# Patient Record
Sex: FEMALE | Race: WHITE | URBAN - METROPOLITAN AREA
[De-identification: names, ages, dates, MRNs, and addresses within clinical notes are randomized per-mention and may not be internally consistent; named-entity substitution may affect disease eponyms.]

---

## 2018-07-24 ENCOUNTER — CARE COORDINATION (OUTPATIENT)
Dept: CARDIOLOGY | Facility: CLINIC | Age: 58
End: 2018-07-24

## 2018-07-24 NOTE — PROGRESS NOTES
Received message that patient called inquiring about an appt she has with Dr. Almanza on 8/1.  Called patient back to inform her the appt is with Dr. Almanza in [place] on 8/1 as Dr. Almanza sees patients there once/month at the [facility].  Pt confirmed she will be there.

## 2018-08-01 ENCOUNTER — MEDICAL CORRESPONDENCE (OUTPATIENT)
Dept: CARDIOLOGY | Facility: CLINIC | Age: 58
End: 2018-08-01
Payer: MEDICARE

## 2018-08-01 ENCOUNTER — TRANSFERRED RECORDS (OUTPATIENT)
Dept: HEALTH INFORMATION MANAGEMENT | Facility: CLINIC | Age: 58
End: 2018-08-01

## 2018-08-01 PROCEDURE — 99214 OFFICE O/P EST MOD 30 MIN: CPT | Mod: ZP | Performed by: INTERNAL MEDICINE

## 2018-08-07 DIAGNOSIS — I42.8 LEFT VENTRICULAR NON-COMPACTION CARDIOMYOPATHY (H): Primary | ICD-10-CM

## 2018-08-07 DIAGNOSIS — I50.22 CHRONIC SYSTOLIC HEART FAILURE (H): ICD-10-CM

## 2018-08-07 RX ORDER — LIDOCAINE 40 MG/G
CREAM TOPICAL
Status: CANCELLED | OUTPATIENT
Start: 2018-08-07 | End: 2018-12-07

## 2018-08-27 DIAGNOSIS — I50.22 CHRONIC SYSTOLIC CONGESTIVE HEART FAILURE (H): ICD-10-CM

## 2018-08-27 DIAGNOSIS — I42.8 LEFT VENTRICULAR NON-COMPACTION CARDIOMYOPATHY (H): Primary | ICD-10-CM

## 2018-08-30 ENCOUNTER — CARE COORDINATION (OUTPATIENT)
Dept: CARDIOLOGY | Facility: CLINIC | Age: 58
End: 2018-08-30

## 2018-08-30 DIAGNOSIS — E78.5 HYPERLIPIDEMIA LDL GOAL <130: ICD-10-CM

## 2018-08-30 DIAGNOSIS — Z00.00 PREVENTATIVE HEALTH CARE: ICD-10-CM

## 2018-08-30 DIAGNOSIS — J30.2 CHRONIC SEASONAL ALLERGIC RHINITIS, UNSPECIFIED TRIGGER: ICD-10-CM

## 2018-08-30 DIAGNOSIS — G47.09 OTHER INSOMNIA: ICD-10-CM

## 2018-08-30 DIAGNOSIS — Z78.0 MENOPAUSE: ICD-10-CM

## 2018-08-30 DIAGNOSIS — F32.A DEPRESSION, UNSPECIFIED DEPRESSION TYPE: ICD-10-CM

## 2018-08-30 DIAGNOSIS — A04.72 C. DIFFICILE COLITIS: ICD-10-CM

## 2018-08-30 DIAGNOSIS — Z72.0 CURRENT TOBACCO USE: ICD-10-CM

## 2018-08-30 DIAGNOSIS — I42.8 LEFT VENTRICULAR NON-COMPACTION CARDIOMYOPATHY (H): Primary | ICD-10-CM

## 2018-08-30 DIAGNOSIS — B07.8 OTHER VIRAL WARTS: ICD-10-CM

## 2018-08-30 DIAGNOSIS — I50.22 CHRONIC SYSTOLIC CONGESTIVE HEART FAILURE (H): ICD-10-CM

## 2018-08-30 DIAGNOSIS — K57.30 DIVERTICULOSIS OF LARGE INTESTINE WITHOUT HEMORRHAGE: ICD-10-CM

## 2018-08-30 DIAGNOSIS — M85.80 OSTEOPENIA, UNSPECIFIED LOCATION: ICD-10-CM

## 2018-08-30 DIAGNOSIS — M19.90 ARTHRITIS: ICD-10-CM

## 2018-08-30 DIAGNOSIS — G62.9 NEUROPATHY: Primary | ICD-10-CM

## 2018-08-30 DIAGNOSIS — K21.9 GASTROESOPHAGEAL REFLUX DISEASE, ESOPHAGITIS PRESENCE NOT SPECIFIED: ICD-10-CM

## 2018-08-30 DIAGNOSIS — G47.00 INSOMNIA, UNSPECIFIED TYPE: ICD-10-CM

## 2018-08-30 DIAGNOSIS — R00.2 PALPITATIONS: ICD-10-CM

## 2018-08-30 DIAGNOSIS — I10 ESSENTIAL HYPERTENSION: ICD-10-CM

## 2018-09-04 ENCOUNTER — HEALTH MAINTENANCE LETTER (OUTPATIENT)
Age: 58
End: 2018-09-04

## 2018-09-05 RX ORDER — CLOPIDOGREL BISULFATE 75 MG/1
75 TABLET ORAL DAILY
COMMUNITY
Start: 2018-08-30

## 2018-09-05 RX ORDER — ATORVASTATIN CALCIUM 80 MG/1
80 TABLET, FILM COATED ORAL DAILY
COMMUNITY
Start: 2018-08-30

## 2018-09-05 RX ORDER — METOPROLOL TARTRATE 100 MG
200 TABLET ORAL DAILY
COMMUNITY
Start: 2018-08-30 | End: 2018-09-11

## 2018-09-05 ASSESSMENT — ENCOUNTER SYMPTOMS
EYE REDNESS: 0
NERVOUS/ANXIOUS: 1
BOWEL INCONTINENCE: 0
ALTERED TEMPERATURE REGULATION: 1
SYNCOPE: 0
SINUS CONGESTION: 1
DEPRESSION: 1
HOT FLASHES: 0
BACK PAIN: 1
FATIGUE: 1
SLEEP DISTURBANCES DUE TO BREATHING: 1
TROUBLE SWALLOWING: 0
DECREASED LIBIDO: 1
TREMORS: 0
PALPITATIONS: 1
RECTAL PAIN: 0
HYPERTENSION: 0
POOR WOUND HEALING: 0
BREAST PAIN: 0
SMELL DISTURBANCE: 1
DECREASED CONCENTRATION: 1
SKIN CHANGES: 0
FEVER: 0
PANIC: 0
SWOLLEN GLANDS: 0
COUGH: 1
BLOATING: 1
HALLUCINATIONS: 0
CONSTIPATION: 1
BLOOD IN STOOL: 0
DOUBLE VISION: 0
NUMBNESS: 1
HEMATURIA: 0
MUSCLE WEAKNESS: 1
INCREASED ENERGY: 1
INSOMNIA: 1
DYSPNEA ON EXERTION: 1
HEARTBURN: 1
POSTURAL DYSPNEA: 0
LEG PAIN: 1
NIGHT SWEATS: 1
FLANK PAIN: 0
WEIGHT GAIN: 1
BREAST MASS: 0
EYE PAIN: 0
MUSCLE CRAMPS: 1
NECK PAIN: 1
EYE IRRITATION: 1
POLYPHAGIA: 1
SINUS PAIN: 1
WHEEZING: 0
EXERCISE INTOLERANCE: 1
HEADACHES: 0
NECK MASS: 0
VOMITING: 0
SEIZURES: 0
SNORES LOUDLY: 1
MYALGIAS: 1
ABDOMINAL PAIN: 1
DIARRHEA: 1
JOINT SWELLING: 0
DIFFICULTY URINATING: 1
TASTE DISTURBANCE: 1
CHILLS: 0
ORTHOPNEA: 0
WEAKNESS: 1
LIGHT-HEADEDNESS: 1
NAIL CHANGES: 1
NAUSEA: 1
COUGH DISTURBING SLEEP: 1
SPEECH CHANGE: 0
EYE WATERING: 1
SPUTUM PRODUCTION: 0
HYPOTENSION: 0
DECREASED APPETITE: 1
LOSS OF CONSCIOUSNESS: 0
BRUISES/BLEEDS EASILY: 1
WEIGHT LOSS: 0
MEMORY LOSS: 0
DYSURIA: 0
PARALYSIS: 0
HEMOPTYSIS: 0
SHORTNESS OF BREATH: 1
ARTHRALGIAS: 1
POLYDIPSIA: 1
JAUNDICE: 0
DIZZINESS: 1
STIFFNESS: 1
SORE THROAT: 1
TINGLING: 1

## 2018-09-10 ENCOUNTER — PRE VISIT (OUTPATIENT)
Dept: CARDIOLOGY | Facility: CLINIC | Age: 58
End: 2018-09-10

## 2018-09-10 DIAGNOSIS — I50.22 CHRONIC SYSTOLIC CONGESTIVE HEART FAILURE (H): Primary | ICD-10-CM

## 2018-09-10 DIAGNOSIS — Z79.01 CHRONIC ANTICOAGULATION: ICD-10-CM

## 2018-09-10 PROBLEM — G47.09 OTHER INSOMNIA: Status: ACTIVE | Noted: 2018-09-10

## 2018-09-10 RX ORDER — SPIRONOLACTONE 25 MG/1
25 TABLET ORAL DAILY
COMMUNITY
Start: 2018-09-10 | End: 2018-09-11

## 2018-09-10 RX ORDER — FLUOROURACIL 50 MG/G
CREAM TOPICAL
COMMUNITY
Start: 2018-09-10

## 2018-09-10 RX ORDER — SERTRALINE HYDROCHLORIDE 100 MG/1
100 TABLET, FILM COATED ORAL DAILY
COMMUNITY
Start: 2018-09-10

## 2018-09-10 RX ORDER — GABAPENTIN 300 MG/1
600 CAPSULE ORAL 3 TIMES DAILY
COMMUNITY
Start: 2018-09-10

## 2018-09-10 RX ORDER — MULTIPLE VITAMINS W/ MINERALS TAB 9MG-400MCG
1 TAB ORAL DAILY
COMMUNITY
Start: 2018-09-10

## 2018-09-10 RX ORDER — ESOMEPRAZOLE MAGNESIUM 40 MG/1
40 CAPSULE, DELAYED RELEASE ORAL
COMMUNITY
Start: 2018-09-10

## 2018-09-10 RX ORDER — ZOLPIDEM TARTRATE 10 MG/1
10 TABLET ORAL
Status: ON HOLD | COMMUNITY
Start: 2018-09-10 | End: 2018-09-11

## 2018-09-10 RX ORDER — FUROSEMIDE 80 MG
TABLET ORAL
COMMUNITY
Start: 2018-09-10

## 2018-09-10 NOTE — PROGRESS NOTES
Patient is followed by Dr. Almanza in [place] at the Heart Failure Outreach site for chronic systolic heart failure and LV noncompaction cardiomyopathy.  Patient was seen recently in August (scanned under notes tab) and per Dr. Almanza's recommendation, she has been set up for a CPX and RHC at the .  In addition, as patient's son has h/o cardiomyopathy, patient has been set up to see the cardiology genetics counselor, Velia Sharma on the same day. Letter with directions and itinerary has been mailed to patient.  Note: patient records report she is taking warfarin (for LV non-compaction cardiomyopathy?) but dose was not found in records.  She was instructed to hold it for 2 days piror to the RHC via pre-procedure instructions in letter.    *Attempted to call pt today (9/10) to confirm appts.  She was not available, but her  reported she did receive the letter and instructions.

## 2018-09-11 ENCOUNTER — APPOINTMENT (OUTPATIENT)
Dept: CARDIOLOGY | Facility: CLINIC | Age: 58
End: 2018-09-11
Attending: INTERNAL MEDICINE
Payer: MEDICARE

## 2018-09-11 ENCOUNTER — APPOINTMENT (OUTPATIENT)
Dept: LAB | Facility: CLINIC | Age: 58
End: 2018-09-11
Attending: GENETIC COUNSELOR, MS
Payer: MEDICARE

## 2018-09-11 ENCOUNTER — APPOINTMENT (OUTPATIENT)
Dept: MEDSURG UNIT | Facility: CLINIC | Age: 58
End: 2018-09-11
Attending: GENETIC COUNSELOR, MS
Payer: MEDICARE

## 2018-09-11 ENCOUNTER — OFFICE VISIT (OUTPATIENT)
Dept: CARDIOLOGY | Facility: CLINIC | Age: 58
End: 2018-09-11
Attending: GENETIC COUNSELOR, MS
Payer: MEDICARE

## 2018-09-11 ENCOUNTER — HOSPITAL ENCOUNTER (OUTPATIENT)
Facility: CLINIC | Age: 58
Discharge: HOME | End: 2018-09-11
Attending: INTERNAL MEDICINE | Admitting: INTERNAL MEDICINE
Payer: MEDICARE

## 2018-09-11 ENCOUNTER — HOSPITAL ENCOUNTER (OUTPATIENT)
Dept: CARDIOLOGY | Facility: CLINIC | Age: 58
Discharge: HOME | End: 2018-09-11
Attending: INTERNAL MEDICINE | Admitting: INTERNAL MEDICINE
Payer: MEDICARE

## 2018-09-11 VITALS
OXYGEN SATURATION: 96 % | SYSTOLIC BLOOD PRESSURE: 132 MMHG | RESPIRATION RATE: 18 BRPM | DIASTOLIC BLOOD PRESSURE: 50 MMHG | TEMPERATURE: 97.1 F | HEART RATE: 77 BPM

## 2018-09-11 VITALS
HEART RATE: 75 BPM | SYSTOLIC BLOOD PRESSURE: 97 MMHG | DIASTOLIC BLOOD PRESSURE: 62 MMHG | OXYGEN SATURATION: 98 % | BODY MASS INDEX: 22.36 KG/M2 | WEIGHT: 126.2 LBS | HEIGHT: 63 IN

## 2018-09-11 DIAGNOSIS — I50.22 CHRONIC SYSTOLIC CONGESTIVE HEART FAILURE (H): ICD-10-CM

## 2018-09-11 DIAGNOSIS — Z82.49 FAMILY HISTORY OF CARDIOMYOPATHY: ICD-10-CM

## 2018-09-11 DIAGNOSIS — I42.8 LEFT VENTRICULAR NON-COMPACTION CARDIOMYOPATHY (H): ICD-10-CM

## 2018-09-11 DIAGNOSIS — I50.22 CHRONIC SYSTOLIC CONGESTIVE HEART FAILURE (H): Primary | ICD-10-CM

## 2018-09-11 DIAGNOSIS — I42.8 LEFT VENTRICULAR NON-COMPACTION CARDIOMYOPATHY (H): Primary | ICD-10-CM

## 2018-09-11 DIAGNOSIS — Z95.810 ICD (IMPLANTABLE CARDIOVERTER-DEFIBRILLATOR) IN PLACE: ICD-10-CM

## 2018-09-11 DIAGNOSIS — I50.22 CHRONIC SYSTOLIC HEART FAILURE (H): ICD-10-CM

## 2018-09-11 DIAGNOSIS — Z79.01 CHRONIC ANTICOAGULATION: ICD-10-CM

## 2018-09-11 LAB
ABO + RH BLD: NORMAL
ABO + RH BLD: NORMAL
ALBUMIN SERPL-MCNC: 3.5 G/DL (ref 3.4–5)
ALP SERPL-CCNC: 80 U/L (ref 40–150)
ALT SERPL W P-5'-P-CCNC: 42 U/L (ref 0–50)
ANION GAP SERPL CALCULATED.3IONS-SCNC: 5 MMOL/L (ref 3–14)
AST SERPL W P-5'-P-CCNC: 28 U/L (ref 0–45)
BILIRUB SERPL-MCNC: 0.6 MG/DL (ref 0.2–1.3)
BLD GP AB SCN SERPL QL: NORMAL
BLOOD BANK CMNT PATIENT-IMP: NORMAL
BUN SERPL-MCNC: 13 MG/DL (ref 7–30)
CALCIUM SERPL-MCNC: 8.4 MG/DL (ref 8.5–10.1)
CHLORIDE SERPL-SCNC: 105 MMOL/L (ref 94–109)
CO2 SERPL-SCNC: 30 MMOL/L (ref 20–32)
CREAT SERPL-MCNC: 0.63 MG/DL (ref 0.52–1.04)
ERYTHROCYTE [DISTWIDTH] IN BLOOD BY AUTOMATED COUNT: 13.2 % (ref 10–15)
GFR SERPL CREATININE-BSD FRML MDRD: >90 ML/MIN/1.7M2
GLUCOSE SERPL-MCNC: 84 MG/DL (ref 70–99)
HCT VFR BLD AUTO: 40.9 % (ref 35–47)
HGB BLD-MCNC: 13.8 G/DL (ref 11.7–15.7)
INR PPP: 1.19 (ref 0.86–1.14)
MCH RBC QN AUTO: 34.2 PG (ref 26.5–33)
MCHC RBC AUTO-ENTMCNC: 33.7 G/DL (ref 31.5–36.5)
MCV RBC AUTO: 102 FL (ref 78–100)
NT-PROBNP SERPL-MCNC: 1281 PG/ML (ref 0–125)
PLATELET # BLD AUTO: 209 10E9/L (ref 150–450)
POTASSIUM SERPL-SCNC: 3.3 MMOL/L (ref 3.4–5.3)
PROT SERPL-MCNC: 6.8 G/DL (ref 6.8–8.8)
RBC # BLD AUTO: 4.03 10E12/L (ref 3.8–5.2)
SODIUM SERPL-SCNC: 140 MMOL/L (ref 133–144)
SPECIMEN EXP DATE BLD: NORMAL
WBC # BLD AUTO: 10.3 10E9/L (ref 4–11)

## 2018-09-11 PROCEDURE — 85610 PROTHROMBIN TIME: CPT | Performed by: INTERNAL MEDICINE

## 2018-09-11 PROCEDURE — 40000166 ZZH STATISTIC PP CARE STAGE 1

## 2018-09-11 PROCEDURE — 86900 BLOOD TYPING SEROLOGIC ABO: CPT

## 2018-09-11 PROCEDURE — 86850 RBC ANTIBODY SCREEN: CPT

## 2018-09-11 PROCEDURE — 25000125 ZZHC RX 250: Performed by: INTERNAL MEDICINE

## 2018-09-11 PROCEDURE — 27210982 ZZH KIT RT HC TOTES DISP CR7

## 2018-09-11 PROCEDURE — 86901 BLOOD TYPING SEROLOGIC RH(D): CPT

## 2018-09-11 PROCEDURE — 80053 COMPREHEN METABOLIC PANEL: CPT

## 2018-09-11 PROCEDURE — 36415 COLL VENOUS BLD VENIPUNCTURE: CPT | Performed by: INTERNAL MEDICINE

## 2018-09-11 PROCEDURE — 27211181 ZZH BALLOON TIP PRESSURE CR5

## 2018-09-11 PROCEDURE — 94621 CARDIOPULM EXERCISE TESTING: CPT

## 2018-09-11 PROCEDURE — 93451 RIGHT HEART CATH: CPT | Mod: 26 | Performed by: INTERNAL MEDICINE

## 2018-09-11 PROCEDURE — 94621 CARDIOPULM EXERCISE TESTING: CPT | Mod: 26 | Performed by: INTERNAL MEDICINE

## 2018-09-11 PROCEDURE — 93451 RIGHT HEART CATH: CPT

## 2018-09-11 PROCEDURE — 99214 OFFICE O/P EST MOD 30 MIN: CPT | Mod: 24 | Performed by: INTERNAL MEDICINE

## 2018-09-11 PROCEDURE — 93295 DEV INTERROG REMOTE 1/2/MLT: CPT | Performed by: INTERNAL MEDICINE

## 2018-09-11 PROCEDURE — 85027 COMPLETE CBC AUTOMATED: CPT | Performed by: INTERNAL MEDICINE

## 2018-09-11 PROCEDURE — 93296 REM INTERROG EVL PM/IDS: CPT | Mod: ZF

## 2018-09-11 PROCEDURE — 27210787 ZZH MANIFOLD CR2

## 2018-09-11 PROCEDURE — 83880 ASSAY OF NATRIURETIC PEPTIDE: CPT

## 2018-09-11 PROCEDURE — 96040 ZZH GENETIC COUNSELING, EACH 30 MINUTES: CPT | Mod: ZF | Performed by: GENETIC COUNSELOR, MS

## 2018-09-11 PROCEDURE — G0463 HOSPITAL OUTPT CLINIC VISIT: HCPCS | Mod: 25,ZF

## 2018-09-11 RX ORDER — METOPROLOL SUCCINATE 200 MG/1
100 TABLET, EXTENDED RELEASE ORAL DAILY
Qty: 30 TABLET
Start: 2018-09-11

## 2018-09-11 RX ORDER — LIDOCAINE 40 MG/G
CREAM TOPICAL
Status: COMPLETED | OUTPATIENT
Start: 2018-09-11 | End: 2018-09-11

## 2018-09-11 RX ORDER — LOSARTAN POTASSIUM 25 MG/1
25 TABLET ORAL DAILY
Qty: 90 TABLET | Refills: 3 | Status: SHIPPED | OUTPATIENT
Start: 2018-09-11 | End: 2019-11-14

## 2018-09-11 RX ORDER — TEMAZEPAM 15 MG/1
15 CAPSULE ORAL
COMMUNITY

## 2018-09-11 RX ORDER — SACCHAROMYCES BOULARDII 250 MG
250 CAPSULE ORAL
COMMUNITY
Start: 2018-01-08

## 2018-09-11 RX ORDER — FAMOTIDINE 20 MG/1
20 TABLET, FILM COATED ORAL
COMMUNITY
Start: 2018-01-15

## 2018-09-11 RX ADMIN — LIDOCAINE: 40 CREAM TOPICAL at 13:35

## 2018-09-11 ASSESSMENT — PAIN SCALES - GENERAL: PAINLEVEL: NO PAIN (0)

## 2018-09-11 NOTE — LETTER
9/11/2018    RE: Ange Christopher  [address] [place] [id number]       Dear Colleague,    Thank you for the opportunity to participate in the care of your patient, Ange Christopher, at the [facility]. Please see a copy of my visit note below.    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  The patient was initially seen by my colleague, Milagros Thacker MD, on January 22, 2014, and was seen later that same year, but was recently sent to re-establish care in my [facility] is [place] due to her young age and progressive symptoms. The patient reported that over the past several months she has had gradual worsening in her heart failure symptoms. She reports worsening of her exercise tolerance. She feels that she can still do approximately the same amount of activity, it just takes her much more energy to do it, and it is much more difficult for her to do. She does report worsening dyspnea on exertion and increasing issues with volume retention including weight gain, edema, and abdominal bloating. She admits having to take extra doses of Lasix more often. She denies any chest pain or chest pressure, orthopnea, or paroxysmal nocturnal dyspnea (PND). She also denies any palpitations. She denies any problems with her current medications and reports compliance.l stressors in her life at the present moment.       PAST MEDICAL HISTORY:  Nonischemic cardiomyopathy. The patient was initially diagnosed with her nonischemic cardiomyopathy in 2013. At that point in time she presented with flu-like symptoms and was ultimately discovered to have a low ejection fraction. Echocardiograms were consistent with  non-compaction. The patient had a cardiac catheterization on January 18, 2013, and had no evidence of epicardial disease and her ejection fraction was 35%. She also had a Zio Patch study done around that time and was found to have some premature ventricular contractions (PVCs) as well as some ventricular tachyarrhythmia. The patient underwent implantable cardioverter-defibrillator (ICD) placement in March 2013 and she was also started with medical therapy at that time. Due to patient's recent issues with worsening exercise tolerance, as well as atypical chest pain symptoms, the patient underwent a myocardial perfusion scan which revealed moderate-sized, mild-intensity, anterior and anteroseptal wall ischemia. Thus, it was deemed a positive myocardial perfusion scan for moderate-sized, mid-intensity wall ischemia. This was a Regadenoson stress test that was performed. The patient also had an echocardiogram done that same day which revealed left ventricular systolic function was severely reduced with a calculated left ventricular ejection fraction of 22%, the left ventricular cavity size was moderately enlarged with global hypokinesis. There was mild to moderate mitral regurgitation and mild tricuspid regurgitation, and compared to a previous echocardiogram done in September 2015 there was a higher degree of mitral regurgitation and tricuspid regurgitation. Following this positive myocardial Regadenoson stress single-photon emission computerized tomography (SPECT) scan the patient underwent cardiac catheterization. She was found to have minimal to nonocclusive coronary artery disease. A previous stent that was placed in January 2016 in the first diagonal branch of the left anterior descending (LAD) coronary artery which was found to be patent. Left ventricular (LV) systolic pressures were 99 to 103 mmHg with left ventricular end-diastolic pressures (LVEDP) of 21 to 24 mmHg.  Past Medical History:   Diagnosis Date      "Arthritis 2018     C. difficile colitis; 3/2017, 2018     Chronic anticoagulation 9/10/2018     Chronic systolic congestive heart failure (H) 2018     Current tobacco use; 1ppd since 2018     Diverticulosis of large intestine 2018     Essential hypertension 2018     Hyperlipidemia LDL goal <130 2018     Left ventricular non-compaction cardiomyopathy (H) 2014     Osteopenia 2018     Other insomnia 9/10/2018     Palpitations 2018     PAST SURGICAL HISTORY    1.   .   2.   Hysterectomy with oophorectomy.   3.   Tonsillectomy.   4.   Left carpal tunnel surgery.  5.   Bunion surgery on her left foot.   6.   Implantable cardioverter-defibrillator (ICD) implantation in , and relocation in 2013.    FAMILY HISTORY:  Her mother  in her early 70s, she had no known heart problems. Her dad is currently alive with no known heart problems. There is no history of early coronary artery disease, sudden cardiac death, or congenital heart disease; however, her son had left ventricular assist device (LVAD) placed due to nonischemic cardiomyopathy in . Reportedly, he does not have non-compaction but this was thought to be a \"viral cardiomyopathy\". The patient also has a paternal grandmother who  from \"blood clots\", there was some concern she also may have had an issue with heart disease.    SOCIAL HISTORY:  She is an active smoker, she smokes approximately 1/2 to 1 pack per day. She has an approximately 70 pack-year history. She started smoking at age 16. She denies any ethanol (alcohol) use. She denies any intravenous (IV) drug abuse. She is , she has 3 kids.    CURRENT MEDICATIONS:    Current Outpatient Prescriptions on File Prior to Visit:  aspirin 81 MG tablet Take 1 tablet (81 mg) by mouth daily   atorvastatin (LIPITOR) 80 MG tablet Take 1 tablet (80 mg) by mouth daily   cetirizine HCl 10 MG CAPS Take 10 mg by mouth daily as needed " "  cholecalciferol (VITAMIN D3) 400 UNIT TABS tablet Take 1 tablet (400 Units) by mouth 2 times daily   clopidogrel (PLAVIX) 75 MG tablet Take 1 tablet (75 mg) by mouth daily   esomeprazole (NEXIUM) 40 MG CR capsule Take 1 capsule (40 mg) by mouth every morning (before breakfast) Take 30-60 minutes before eating.   ESTRADIOL 0.5/ESTRIOL 0.25 MG/GM CREAM Use twice weekly   fluorouracil (EFUDEX) 5 % cream Apply as directed to warts   furosemide (LASIX) 80 MG tablet Take one tab (80 mg) in am and 1/2 tab (40 mg) in pm daily   gabapentin (NEURONTIN) 300 MG capsule Take 2 capsules (600 mg) by mouth 3 times daily   multivitamin, therapeutic with minerals (MULTI-VITAMIN) TABS tablet Take 1 tablet by mouth daily   PROVIDER DOSED MANAGED WARFARIN, COUMADIN, OUTPATIENT Unknown dose   temazepam (RESTORIL) 15 MG capsule Take 15 mg by mouth nightly as needed for sleep   sertraline (ZOLOFT) 100 MG tablet Take 1 tablet (100 mg) by mouth daily     No current facility-administered medications on file prior to visit.       ROS:   Constitutional: No fever, chills, or sweats.   ENT: No visual disturbance, ear ache, epistaxis, sore throat.   Allergies/Immunologic: Negative.   Respiratory: No cough, hemoptysis.   Cardiovascular: As per HPI.   GI: No nausea, vomiting, hematemesis, melena, or hematochezia.   : No urinary frequency, dysuria, or hematuria.   Integument: Negative.   Psychiatric: Negative.   Neuro: Negative.   Endocrinology: Negative.   Musculoskeletal: Negative.    EXAM:  BP 97/62 (BP Location: Left arm, Patient Position: Chair, Cuff Size: Adult Regular)  Pulse 75  Ht 1.6 m (5' 3\")  Wt 57.2 kg (126 lb 3.2 oz)  SpO2 98%  BMI 22.36 kg/m2  General: appears comfortable, alert and articulate  Head: normocephalic, atraumatic  Eyes: anicteric sclera, EOMI  Neck: no adenopathy, 2+ carotids without bruits  Orophyarynx: moist mucosa, no lesions, dentition intact  Heart: regular, S1/S2, no murmur, gallop, rub, estimated JVP " 6-7cm  Lungs: clear, no rales or wheezing  Abdomen: soft, non-tender, bowel sounds present, no hepatomegaly  Extremities: no clubbing, cyanosis, trace edema  Neurological: normal speech and affect, no gross motor deficits    Labs:  CBC RESULTS:  Lab Results   Component Value Date    WBC 10.3 09/11/2018    RBC 4.03 09/11/2018    HGB 13.8 09/11/2018    HCT 40.9 09/11/2018     (H) 09/11/2018    MCH 34.2 (H) 09/11/2018    MCHC 33.7 09/11/2018    RDW 13.2 09/11/2018     09/11/2018       CMP RESULTS:  Lab Results   Component Value Date     09/11/2018    POTASSIUM 3.3 (L) 09/11/2018    CHLORIDE 105 09/11/2018    CO2 30 09/11/2018    ANIONGAP 5 09/11/2018    GLC 84 09/11/2018    BUN 13 09/11/2018    CR 0.63 09/11/2018    GFRESTIMATED >90 09/11/2018    GFRESTBLACK >90 09/11/2018    MAURO 8.4 (L) 09/11/2018    BILITOTAL 0.6 09/11/2018    ALBUMIN 3.5 09/11/2018    ALKPHOS 80 09/11/2018    ALT 42 09/11/2018    AST 28 09/11/2018        INR RESULTS:  Lab Results   Component Value Date    INR 1.19 (H) 09/11/2018       No results found for: MAG  No results found for: NTBNPI  Lab Results   Component Value Date    NTBNP 1281 (H) 09/11/2018     RHC 9/11/18  1. HR 75 bpm  2. BP 99/54/69 mmHg  3. RA 6/6/5   4. RV 29/6  5. PA 27/12/17   6. PCW 14/14/12   7. PA sat 64%   9. Hgb 12.2 g/dL   10. Ana CO 4.2   11. Ana CI 2.6   12. TD CO 4.2   13. TD CI 2.6   14. 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   1. Chronic systolic heart failure secondary to nonischemic/familial cardiomyopathy.    Stage C  NYHA Class IIIb  ACEi/ARB will start losartan 25mg daily  BB decrease metoprolol xl to 100mg daily  Aldosterone antagonist discontinue aldactone while  trying to initiate and up-titrate losartan  SCD prophylaxis ICD  Fluid status euvolemic confirmed by RHC today  CPX revealed exercise limitations patient did not meet anaerobic threshold thus limitations not all cardiac in nature.  At present no indications for advanced therapy given results of RHC.  I did relate to the patient that for her to be a candidate for heart transplantation if needed in the future, that she would need to be cigarette and completely tobacco free, including tobacco replacement products, for at least 3, but preferably 6, months. The patient would also need to have further evaluation of other comorbidities to determine candidacy for advanced therapy should this be indicated in future  Encouraged patient to begin regular aerobic exercise aiming for at least 150 minutes of moderate physical activity each week. and follow low-salt, heart healthy diet.    Pt met with genetic counselor today and opted to pursue genetic testing for familial dilated cardiomyopathy.  Will f/u results.       Follow up:  Labs in 2 weeks.  To see me in October in [place] as scheduled      Beto Almanza MD  Section Head - Advanced Heart Failure, Transplantation and Mechanical Circulatory Support  Co-Director - Adult Congenital and Cardiovascular Genetics Center  Associate Professor of Medicine, [facility]    CC  Patient Care Team:  Angel Corral as PCP - General  BETO ALMANZA

## 2018-09-11 NOTE — DISCHARGE INSTRUCTIONS
[facility]                        Interventional Cardiology  Discharge Instructions   Post Right Heart Cath      AFTER YOU GO HOME:    DO drink plenty of fluids    DO resume your regular diet and medications unless otherwise instructed by your Primary Physician    Do Not scrub the procedure site vigorously    No lotion or powder to the puncture site for 3 days    CALL YOUR PRIMARY PHYSICIAN IF: You may resume all normal activity.  Monitor neck site for bleeding, swelling, or voice changes. If you notice bleeding or swelling immediately apply pressure to the site and call number below to speak with Cardiology Fellow.  If you experience any changes in your breathing you should call your doctor immediately or come to the closest Emergency Department.  Do not drive yourself.    ADDITIONAL INSTRUCTIONS: Medications: You are to resume all home medications including anticoagulation therapy unless otherwise advised by your primary cardiologist or nurse coordinator.    Follow Up: Per your primary cardiology team    If you have any questions or concerns regarding your procedure site please call [phone] at anytime and ask for Cardiology Fellow on call.  They are available 24 hours a day.  You may also contact the Cardiology Clinic after hours number at [phone].                                                       Telephone Numbers [phone] Monday-Friday 8:00 am to 4:30 pm    [phone] After 4:30 pm Monday-Friday, Weekends & Holidays  Ask for Interventional Cardiologist on call. Someone is on call 24 hours/day   [facility] toll free number [phone] Monday-Friday 8:00 am to 4:30 pm   [facility] Emergency Dept [phone]

## 2018-09-11 NOTE — MR AVS SNAPSHOT
After Visit Summary   9/11/2018    Ange Christopher    MRN: [id number]           Patient Information     Date Of Birth          1960        Visit Information        Provider Department      9/11/2018 3:30 PM Velia Sharma [facility]         Follow-ups after your visit        Who to contact     If you have questions or need follow up information about today's clinic visit or your schedule please contact [facility] directly at [phone].  Normal or non-critical lab and imaging results will be communicated to you by [employer]hart, letter or phone within 4 business days after the clinic has received the results. If you do not hear from us within 7 days, please contact the clinic through [employer] or phone. If you have a critical or abnormal lab result, we will notify you by phone as soon as possible.  Submit refill requests through [employer] or call your pharmacy and they will forward the refill request to us. Please allow 3 business days for your refill to be completed.          Additional Information About Your Visit        [employer] Information     [employer] gives you secure access to your electronic health record. If you see a primary care provider, you can also send messages to your care team and make appointments. If you have questions, please call your primary care clinic.  If you do not have a primary care provider, please call [phone] and they will assist you.        Care EveryWhere ID     This is your Care EveryWhere ID. This could be used by other organizations to access your [place] medical records  [id number]         Blood Pressure from Last 3 Encounters:   09/11/18 97/62   09/11/18 132/50    Weight from Last 3 Encounters:   09/11/18 57.2 kg (126 lb 3.2 oz)              Today, you had the following     No orders found for display         Today's Medication Changes          These changes are accurate as of 9/11/18 11:59 PM.  If you have any questions, ask  your nurse or doctor.               Start taking these medicines.        Dose/Directions    losartan 25 MG tablet   Commonly known as:  COZAAR   Used for:  Chronic systolic congestive heart failure (H)   Started by:  Cady Almanza MD        Dose:  25 mg   Take 1 tablet (25 mg) by mouth daily   Quantity:  90 tablet   Refills:  3       metoprolol succinate 200 MG 24 hr tablet   Commonly known as:  TOPROL-XL   Used for:  Chronic systolic congestive heart failure (H)   Started by:  Cady Almanza MD        Dose:  100 mg   Take 0.5 tablets (100 mg) by mouth daily   Quantity:  30 tablet   Refills:  0         Stop taking these medicines if you haven't already. Please contact your care team if you have questions.     metoprolol tartrate 100 MG tablet   Commonly known as:  LOPRESSOR   Stopped by:  Cady Almanza MD           spironolactone 25 MG tablet   Commonly known as:  ALDACTONE   Stopped by:  Cady Almanza MD                Where to get your medicines      These medications were sent to [facility] [address]  [address] [id number]     Phone:  [phone]     losartan 25 MG tablet         Some of these will need a paper prescription and others can be bought over the counter.  Ask your nurse if you have questions.     You don't need a prescription for these medications     metoprolol succinate 200 MG 24 hr tablet                Primary Care Provider Office Phone # Fax #    Angel Corral [phone]       [address]  [facility] [address]        Equal Access to Services     [facility] AH: Hadii ted quinn hadasho Soflavioali, waaxda luqadaha, qaybta kaalmada adeegyada, gasper lozano. So [facility] [phone].    ATENCIÓN: Si habla español, tiene a boswell disposición servicios gratuitos de asistencia lingüística. Llame al [phone].    We comply with  applicable federal civil rights laws and [place] laws. We do not discriminate on the basis of race, color, national origin, age, disability, sex, sexual orientation, or gender identity.            Thank you!     Thank you for choosing [facility]  for your care. Our goal is always to provide you with excellent care. Hearing back from our patients is one way we can continue to improve our services. Please take a few minutes to complete the written survey that you may receive in the mail after your visit with us. Thank you!             Your Updated Medication List - Protect others around you: Learn how to safely use, store and throw away your medicines at www.disposemymeds.org.          This list is accurate as of 9/11/18 11:59 PM.  Always use your most recent med list.                   Brand Name Dispense Instructions for use Diagnosis    aspirin 81 MG tablet      Take 1 tablet (81 mg) by mouth daily    Preventative health care       cetirizine HCl 10 MG Caps      Take 10 mg by mouth daily as needed    Chronic seasonal allergic rhinitis, unspecified trigger       cholecalciferol 400 UNIT Tabs tablet    vitamin D3     Take 1 tablet (400 Units) by mouth 2 times daily    Osteopenia, unspecified location       clopidogrel 75 MG tablet    PLAVIX     Take 1 tablet (75 mg) by mouth daily    Left ventricular non-compaction cardiomyopathy (H)       ESTRADIOL 0.5/ESTRIOL 0.25 MG/GM CREAM      Use twice weekly    Menopause       famotidine 20 MG tablet    PEPCID     Take 20 mg by mouth        FLORASTOR 250 MG capsule   Generic drug:  saccharomyces boulardii      Take 250 mg by mouth        fluorouracil 5 % cream    EFUDEX     Apply as directed to warts    Other viral warts       furosemide 80 MG tablet    LASIX     Take one tab (80 mg) in am and 1/2 tab (40 mg) in pm daily    Chronic systolic congestive heart failure (H)       gabapentin 300 MG capsule    NEURONTIN     Take 2 capsules (600 mg) by mouth 3 times daily     Neuropathy       LIPITOR 80 MG tablet   Generic drug:  atorvastatin      Take 1 tablet (80 mg) by mouth daily    Hyperlipidemia LDL goal <130       losartan 25 MG tablet    COZAAR    90 tablet    Take 1 tablet (25 mg) by mouth daily    Chronic systolic congestive heart failure (H)       metoprolol succinate 200 MG 24 hr tablet    TOPROL-XL    30 tablet    Take 0.5 tablets (100 mg) by mouth daily    Chronic systolic congestive heart failure (H)       Multi-vitamin Tabs tablet      Take 1 tablet by mouth daily    Preventative health care       nexIUM 40 MG CR capsule   Generic drug:  esomeprazole      Take 1 capsule (40 mg) by mouth every morning (before breakfast) Take 30-60 minutes before eating.    Gastroesophageal reflux disease, esophagitis presence not specified       PROVIDER DOSED MANAGED WARFARIN (COUMADIN) OUTPATIENT      Unknown dose        temazepam 15 MG capsule    RESTORIL     Take 15 mg by mouth nightly as needed for sleep        ZOLOFT 100 MG tablet   Generic drug:  sertraline      Take 1 tablet (100 mg) by mouth daily    Depression, unspecified depression type

## 2018-09-11 NOTE — PATIENT INSTRUCTIONS
Cardiology Providers you saw during your visit:  Dr. Almanza    Medication changes:  Please DECREASE Metoprolol XL to 100 mg by mouth daily.  Please START Losartan 25 mg by mouth daily.  Please STOP taking Spironolactone.    Follow up:  Blood work ( Basic metabolic panel) in 2 weeks.    Please return to clinic for follow-up:  With Dr. Almanza in October in [place] as scheduled      Please call if you have:  1. Weight gain of more than 2 pounds in a day or 5 pounds in a week  2. Increased shortness of breath, swelling or bloating  3. Dizziness, lightheadedness   4. Any questions or concerns.     Follow the American Heart Association Diet and Lifestyle recommendations:  Limit saturated fat, trans fat, sodium, red meat, sweets and sugar-sweetened beverages. If you choose to eat red meat, compare labels and select the leanest cuts available.  Aim for at least 150 minutes of moderate physical activity or 75 minutes of vigorous physical activity - or an equal combination of both - each week.    During business hours: [phone], press option # 1 to be routed to the [facility] then option # 3 for medical questions to speak with a nurse     After hours, weekends or holidays: On Call Cardiologist- [phone]   option #4 and ask to speak to the on-call Cardiologist. Inform them you are a CORE/heart failure patient at the [facility].      Nakul Bose RN  Cardiology Nurse Care Coordinator    Keep up the good work!    Take Care!

## 2018-09-11 NOTE — NURSING NOTE
Diet: Patient instructed regarding a heart healthy diet, including discussion of reduced fat and sodium intake. Patient demonstrated understanding of this information and agreed to call with further questions or concerns.  Labs: Patient was given results of the laboratory testing obtained today. Patient was instructed to return for the next laboratory testing today and in 2 weeks . Patient demonstrated understanding of this information and agreed to call with further questions or concerns.   Return Appointment: Patient given instructions regarding scheduling next clinic visit. Patient demonstrated understanding of this information and agreed to call with further questions or concerns. Patient is scheduled in October in [place].  Medication Change: Patient was educated regarding prescribed medication change, including discussion of the indication, administration, side effects, and when to report to MD or RN. Patient demonstrated understanding of this information and agreed to call with further questions or concerns.  Patient stated she understood all health information given and agreed to call with further questions or concerns.

## 2018-09-11 NOTE — NURSING NOTE
Chief Complaint   Patient presents with     Follow Up For      Left ventricular non compaction     Vitals were taken and medications were reconciled.    Sharda Cox RMA  3:50 PM

## 2018-09-11 NOTE — MR AVS SNAPSHOT
After Visit Summary   9/11/2018    Ange Christopher    MRN: [id number]           Patient Information     Date Of Birth          1960        Visit Information        Provider Department      9/11/2018 6:00 AM  ICD [facility]        Today's Diagnoses     Left ventricular non-compaction cardiomyopathy (H)    -  1    ICD (implantable cardioverter-defibrillator)- [employer], dual chamber           Follow-ups after your visit        Your next 10 appointments already scheduled     Sep 11, 2018 12:00 PM CDT   LAB with [facility] GOLD WAITING   [facility]    [address] [facility] [address]              Please do not eat 10-12 hours before your appointment if you are coming in fasting for labs on lipids, cholesterol, or glucose (sugar). This does not apply to pregnant women. Water, hot tea and black coffee (with nothing added) are okay. Do not drink other fluids, diet soda or chew gum.            Sep 11, 2018 12:30 PM CDT   Procedure - 2.5 hour with U2A ROOM 11   Unit 2A [facility] [place] ([facility])    [address] [facility] [address]               Sep 11, 2018  1:30 PM CDT   Heart Cath Right Heart Cath with [id number]   [facility]    [address] [facility] [address] [phone]            Sep 11, 2018  3:30 PM CDT   (Arrive by 3:15 PM)   Genetic Counseling with Velia Sharma GC   [facility])    [address] [place] [address]  [address] [place] [id number] [phone]            Sep 11, 2018  5:00 PM CDT   (Arrive by 4:45 PM)   RETURN HEART FAILURE with Cady Almanza MD   [facility])    [address] [place] [address]  [address] [place] [id number] [phone]               Future tests that were ordered for you today     Open Future Orders        Priority Expected Expires Ordered    ABO/Rh type and screen Routine  9/11/2019 9/11/2018    CBC with platelets Routine 9/11/2018 12/31/2018 9/10/2018    INR Routine 9/11/2018 12/31/2018 9/10/2018    Basic metabolic panel Routine 9/11/2018 12/31/2018 9/10/2018            Who to contact     If you have questions or need follow up information about today's clinic visit or your schedule please contact [facility] directly at [phone].  Normal or non-critical lab and imaging results will be communicated to you by Campanistohart, letter or phone within 4 business days after the clinic has received the results. If you do not hear from us within 7 days, please contact the clinic through Bebot or phone. If you have a critical or abnormal lab result, we will notify you by phone as soon as possible.  Submit refill requests through Synbiota or call your pharmacy and they will forward the refill request to us. Please allow 3 business days for your refill to be completed.          Additional Information About Your Visit        MyChart Information     Syn[employer] gives you secure access to your electronic health record. If you see a primary care provider, you can also send messages to your care team and make appointments. If you have questions, please call your primary care clinic.  If you do not have a primary care provider, please call [phone] and they will assist you.        Care EveryWhere ID     This is your Care EveryWhere ID. This could be used by other organizations to access your [place] medical records  [id number]         Blood Pressure from Last 3 Encounters:   No data found for BP    Weight from Last 3 Encounters:   No data found for Wt              We Performed the Following     INTERROGATION DEVICE EVAL REMOTE, PACER/ICD        Primary Care Provider Office Phone # Fax #    Angel Corral [phone]       HE  [facility] [address] [facility] [address]  [facility] [address]        Equal Access to Services     KORY MIRANDA : Hadii ted Maier, wananda jamshid, qaybta ottomaluna dixon, gasper sweeneygraciadora lozano. So [facility] [phone].    ATENCIÓN: Si habla español, tiene a boswell disposición servicios gratuitos de asistencia lingüística. Llame al [phone].    We comply with applicable federal civil rights laws and [place] laws. We do not discriminate on the basis of race, color, national origin, age, disability, sex, sexual orientation, or gender identity.            Thank you!     Thank you for choosing [facility]  for your care. Our goal is always to provide you with excellent care. Hearing back from our patients is one way we can continue to improve our services. Please take a few minutes to complete the written survey that you may receive in the mail after your visit with us. Thank you!             Your Updated Medication List - Protect others around you: Learn how to safely use, store and throw away your medicines at www.disposemymeds.org.          This list is accurate as of 9/11/18 11:25 AM.  Always use your most recent med list.                   Brand Name Dispense Instructions for use Diagnosis    AMBIEN 10 MG tablet   Generic drug:  zolpidem      Take 1 tablet (10 mg) by mouth nightly as needed for sleep    Insomnia, unspecified type       aspirin 81 MG tablet      Take 1 tablet (81 mg) by mouth daily    Preventative health care       cetirizine HCl 10 MG Caps      Take 10 mg by mouth daily as needed    Chronic seasonal allergic rhinitis, unspecified trigger       cholecalciferol 400 UNIT Tabs tablet    vitamin D3     Take 1 tablet (400 Units) by mouth 2 times daily    Osteopenia, unspecified location       clopidogrel 75 MG tablet    PLAVIX     Take 1 tablet (75 mg) by mouth daily    Left ventricular non-compaction cardiomyopathy (H)       ESTRADIOL 0.5/ESTRIOL 0.25  MG/GM CREAM      Use twice weekly    Menopause       fluorouracil 5 % cream    EFUDEX     Apply as directed to warts    Other viral warts       furosemide 80 MG tablet    LASIX     Take one tab (80 mg) in am and 1/2 tab (40 mg) in pm daily    Chronic systolic congestive heart failure (H)       gabapentin 300 MG capsule    NEURONTIN     Take 2 capsules (600 mg) by mouth 3 times daily    Neuropathy       LIPITOR 80 MG tablet   Generic drug:  atorvastatin      Take 1 tablet (80 mg) by mouth daily    Hyperlipidemia LDL goal <130       lisinopril 2.5 MG tablet    PRINIVIL/Zestril    30 tablet    Take 1 tablet (2.5 mg) by mouth daily    Left ventricular non-compaction cardiomyopathy (H)       metoprolol tartrate 100 MG tablet    LOPRESSOR     Take 1 tablet (100 mg) by mouth 2 times daily    Left ventricular non-compaction cardiomyopathy (H), Chronic systolic congestive heart failure (H)       Multi-vitamin Tabs tablet      Take 1 tablet by mouth daily    Preventative health care       nexIUM 40 MG CR capsule   Generic drug:  esomeprazole      Take 1 capsule (40 mg) by mouth every morning (before breakfast) Take 30-60 minutes before eating.    Gastroesophageal reflux disease, esophagitis presence not specified       PROVIDER DOSED MANAGED WARFARIN (COUMADIN) OUTPATIENT      Unknown dose        spironolactone 25 MG tablet    ALDACTONE     Take 1 tablet (25 mg) by mouth daily    Chronic systolic congestive heart failure (H)       ZOLOFT 100 MG tablet   Generic drug:  sertraline      Take 1 tablet (100 mg) by mouth daily    Depression, unspecified depression type

## 2018-09-11 NOTE — MR AVS SNAPSHOT
After Visit Summary   9/11/2018    Ange Christopher    MRN: [id number]           Patient Information     Date Of Birth          1960        Visit Information        Provider Department      9/11/2018 5:00 PM Cady Almanza MD [facility]        Today's Diagnoses     Chronic systolic congestive heart failure (H)    -  1    Left ventricular non-compaction cardiomyopathy (H)          Care Instructions    Cardiology Providers you saw during your visit:  Dr. Almanza    Medication changes:  Please DECREASE Metoprolol XL to 100 mg by mouth daily.  Please START Losartan 25 mg by mouth daily.  Please STOP taking Spironolactone.    Follow up:  Blood work ( Basic metabolic panel) in 2 weeks.    Please return to clinic for follow-up:  With Dr. Almanza in October in [place] as scheduled      Please call if you have:  1. Weight gain of more than 2 pounds in a day or 5 pounds in a week  2. Increased shortness of breath, swelling or bloating  3. Dizziness, lightheadedness   4. Any questions or concerns.     Follow the American Heart Association Diet and Lifestyle recommendations:  Limit saturated fat, trans fat, sodium, red meat, sweets and sugar-sweetened beverages. If you choose to eat red meat, compare labels and select the leanest cuts available.  Aim for at least 150 minutes of moderate physical activity or 75 minutes of vigorous physical activity - or an equal combination of both - each week.    During business hours: [phone], press option # 1 to be routed to the [facility] then option # 3 for medical questions to speak with a nurse     After hours, weekends or holidays: On Call Cardiologist- [phone]   option #4 and ask to speak to the on-call Cardiologist. Inform them you are a CORE/heart failure patient at the [facility].      Nakul Bose, RN  Cardiology Nurse Care Coordinator    Keep up the good work!    Take Care!            Follow-ups after your visit        Future tests that were  ordered for you today     Open Future Orders        Priority Expected Expires Ordered    ABO/Rh type and screen Routine  9/11/2019 9/11/2018    Comprehensive metabolic panel Routine  9/11/2019 9/11/2018    N terminal pro BNP outpatient Routine  9/11/2019 9/11/2018    genetic testing - cardiomyopathy: Laboratory Miscellaneous Order Routine 9/11/2018 9/11/2019 9/11/2018    ABO/Rh type and screen Routine  9/11/2019 9/11/2018    CBC with platelets Routine 9/11/2018 12/31/2018 9/10/2018    INR Routine 9/11/2018 12/31/2018 9/10/2018    Basic metabolic panel Routine 9/11/2018 12/31/2018 9/10/2018            Who to contact     If you have questions or need follow up information about today's clinic visit or your schedule please contact [facility] directly at [phone].  Normal or non-critical lab and imaging results will be communicated to you by TV2 Holdinghart, letter or phone within 4 business days after the clinic has received the results. If you do not hear from us within 7 days, please contact the clinic through JamOrigint or phone. If you have a critical or abnormal lab result, we will notify you by phone as soon as possible.  Submit refill requests through CoaLogix or call your pharmacy and they will forward the refill request to us. Please allow 3 business days for your refill to be completed.          Additional Information About Your Visit        TV2 Holdinghart Information     CoaLogix gives you secure access to your electronic health record. If you see a primary care provider, you can also send messages to your care team and make appointments. If you have questions, please call your primary care clinic.  If you do not have a primary care provider, please call [phone] and they will assist you.        Care EveryWhere ID     This is your Care EveryWhere ID. This could be used by other organizations to access your [place] medical records  [id number]        Your Vitals Were     Pulse Height Pulse Oximetry BMI (Body  "Mass Index)          75 1.6 m (5' 3\") 98% 22.36 kg/m2         Blood Pressure from Last 3 Encounters:   09/11/18 97/62   09/11/18 132/50    Weight from Last 3 Encounters:   09/11/18 57.2 kg (126 lb 3.2 oz)              We Performed the Following     Follow-Up with Advanced Heart Failure Cardiologist          Today's Medication Changes          These changes are accurate as of 9/11/18  5:55 PM.  If you have any questions, ask your nurse or doctor.               Start taking these medicines.        Dose/Directions    losartan 25 MG tablet   Commonly known as:  COZAAR   Used for:  Chronic systolic congestive heart failure (H)   Started by:  Cady Almanza MD        Dose:  25 mg   Take 1 tablet (25 mg) by mouth daily   Quantity:  90 tablet   Refills:  3       metoprolol succinate 200 MG 24 hr tablet   Commonly known as:  TOPROL-XL   Used for:  Chronic systolic congestive heart failure (H)   Started by:  Cady Almanza MD        Dose:  100 mg   Take 0.5 tablets (100 mg) by mouth daily   Quantity:  30 tablet   Refills:  0         Stop taking these medicines if you haven't already. Please contact your care team if you have questions.     metoprolol tartrate 100 MG tablet   Commonly known as:  LOPRESSOR   Stopped by:  Cady Almanza MD           spironolactone 25 MG tablet   Commonly known as:  ALDACTONE   Stopped by:  Cady Almanza MD                Where to get your medicines      These medications were sent to [facility] [address] [facility]  [address], [facility] [address]     Phone:  [phone]     losartan 25 MG tablet         Some of these will need a paper prescription and others can be bought over the counter.  Ask your nurse if you have questions.     You don't need a prescription for these medications     metoprolol succinate 200 MG 24 hr tablet                Primary Care Provider Office Phone # Fax #    Angel Corral [phone] " [phone]       [facility] [address]  [facility] [address]        Equal Access to Services     KORY MIRANDA : Hadii ted Maier, skylerluna randallezioha, oleananth altamiranoalidaluna singherickaluna, gasper nguyen joanlaury singhrobles patelgraciadora lozano. So [facility] [phone].    ATENCIÓN: Si habla español, tiene a boswell disposición servicios gratuitos de asistencia lingüística. Llame al [phone].    We comply with applicable federal civil rights laws and [place] laws. We do not discriminate on the basis of race, color, national origin, age, disability, sex, sexual orientation, or gender identity.            Thank you!     Thank you for choosing [facility]  for your care. Our goal is always to provide you with excellent care. Hearing back from our patients is one way we can continue to improve our services. Please take a few minutes to complete the written survey that you may receive in the mail after your visit with us. Thank you!             Your Updated Medication List - Protect others around you: Learn how to safely use, store and throw away your medicines at www.disposemymeds.org.          This list is accurate as of 9/11/18  5:55 PM.  Always use your most recent med list.                   Brand Name Dispense Instructions for use Diagnosis    aspirin 81 MG tablet      Take 1 tablet (81 mg) by mouth daily    Preventative health care       cetirizine HCl 10 MG Caps      Take 10 mg by mouth daily as needed    Chronic seasonal allergic rhinitis, unspecified trigger       cholecalciferol 400 UNIT Tabs tablet    vitamin D3     Take 1 tablet (400 Units) by mouth 2 times daily    Osteopenia, unspecified location       clopidogrel 75 MG tablet    PLAVIX     Take 1 tablet (75 mg) by mouth daily    Left ventricular non-compaction cardiomyopathy (H)       ESTRADIOL 0.5/ESTRIOL 0.25 MG/GM CREAM      Use twice weekly    Menopause       famotidine 20 MG tablet    PEPCID     Take 20 mg by mouth         FLORASTOR 250 MG capsule   Generic drug:  saccharomyces boulardii      Take 250 mg by mouth        fluorouracil 5 % cream    EFUDEX     Apply as directed to warts    Other viral warts       furosemide 80 MG tablet    LASIX     Take one tab (80 mg) in am and 1/2 tab (40 mg) in pm daily    Chronic systolic congestive heart failure (H)       gabapentin 300 MG capsule    NEURONTIN     Take 2 capsules (600 mg) by mouth 3 times daily    Neuropathy       LIPITOR 80 MG tablet   Generic drug:  atorvastatin      Take 1 tablet (80 mg) by mouth daily    Hyperlipidemia LDL goal <130       losartan 25 MG tablet    COZAAR    90 tablet    Take 1 tablet (25 mg) by mouth daily    Chronic systolic congestive heart failure (H)       metoprolol succinate 200 MG 24 hr tablet    TOPROL-XL    30 tablet    Take 0.5 tablets (100 mg) by mouth daily    Chronic systolic congestive heart failure (H)       Multi-vitamin Tabs tablet      Take 1 tablet by mouth daily    Preventative health care       nexIUM 40 MG CR capsule   Generic drug:  esomeprazole      Take 1 capsule (40 mg) by mouth every morning (before breakfast) Take 30-60 minutes before eating.    Gastroesophageal reflux disease, esophagitis presence not specified       PROVIDER DOSED MANAGED WARFARIN (COUMADIN) OUTPATIENT      Unknown dose        temazepam 15 MG capsule    RESTORIL     Take 15 mg by mouth nightly as needed for sleep        ZOLOFT 100 MG tablet   Generic drug:  sertraline      Take 1 tablet (100 mg) by mouth daily    Depression, unspecified depression type

## 2018-09-11 NOTE — PROGRESS NOTES
Preliminary Device Interrogation Results.  Final physician signed paceart report to be scanned and attached.    [employer], remote transmission received and reviewed. Device transmission sent per MD orders from the [facility] Stress Lab. Her presenting rhythm is AP/VS @ 75 bpm. 133 NSVT episodes recorded over the last 19 months. Normal device function. AP= 99% and = 1%. Lead trends appear stable. Battery estimates 6.5 years to TATYANA. Stress Lab staff notified of transmission results. Plan for pt to continue care with her primary device clinic in [place].  Remote ICD transmission

## 2018-09-11 NOTE — LETTER
2018      RE: Ange Christopher  [address] [place] [id number]       Dear Colleague,    Thank you for the opportunity to participate in the care of your patient, Ange Christopher, at the [facility] HEART [facility]. Please see a copy of my visit note below.    Here is a copy of the progress note from your recent genetic counseling visit [facility] on Date: 2018.    PROGRESS NOTE: Ange was referred by  for genetic counseling due to her history of cardiomyopathy.  I had the opportunity to meet with Ange today to discuss the genetic component of cardiomyopathy and testing options available to her.    MEDICAL HISTORY: Ange reports that she was diagnosed with left ventricular non-compaction in  after experiencing flu like symptoms.  ECHO revealed reduced LVEF.  Heart monitoring showed PVC and ventricular tachyarrhythmia so an ICD was placed.  Recently her symptoms have worsened.      FAMILY HISTORY: A detailed family history was obtained at office visit on 2018.  (Please see scanned pedigree for details).  Family history was significant for the following:    Ange's son was diagnosed with nonischemic cardiomyopathy in .  He had an LVAD placed in .   Initially, his cardiomyopathy was thought to be the result of a virus but given her diagnosis it is likely to be familial. Ange has two daughters who are in good health.  One daughter has four sons and all three have been diagnosed with neurofibromatosis 1, inherited from their father who passed away a couple of years ago in his mid 30's.  Ange also had a son who  a few days after his premature birth.     Ange has a brother and sister in good health, although her sister has some back problems.  Another brother  from suicide.     Ange's mother  in her 70's but cause of death is unclear.  Ange's paternal aunt and uncle have no known health  problems.  Her maternal grandfather  suddenly in his 60-70's.  Maternal grandmother  in her 70-80's but cause is unknown.     Ange's father is in good health at 83 years.  Little information is known about his siblings.  Her paternal grandmother  from reported blood clots but there was a questions about heart disease as well. Paternal grandfather  in a hunting accident.      There is no additional history of cardiomyopathy, arrythmias, heart attacks, fainting, sudden cardiac death, genetic conditions, or birth defects.     DISCUSSION:  Left ventricular non compaction (LVNC) is a form of cardiomopathy in which the myocardium of the left ventricle does not compact during embrology.  LVNC can overlap with dilated cardiomyopathy (DCM) in the family.  Both conditions can be caused by both acquired and genetic causes.    The genes associated with LVNC overlap with the genes causing DCM.  Reviewed autosomal dominant (AD) inheritance pattern most commonly associated with LVNC/DCM, including the 50% risk for recurrence. Explained that DCM gene mutations are associated with reduced penetrance and variable expressivity, meaning that individuals who carry a gene mutation may or may not get the disease and onset and severity can vary from one family member to the next. This explains why you may not see cardiomyopathy in each generation of a family.   Currently over 40 genes have been found to be related to LVNC/DCM. Genetic testing currently identifies mutations in about 40% of idiopathic DCM cases. Detection rates in LVNC are not well defined at this time. Reviewed capabilities, limitations, and logistics of testing.    Explained three possible outcomes of genetic testing including: positive identification of a mutation, no mutation identified, and identification of a variant of unknown significance (VUS). If a mutation is identified, presymptomatic testing would be available to at risk family members. If no  mutation is identified, it does not rule out the possibility of a genetic component to this disease. Family members could still be at risk for developing the same condition. If a VUS is identified, it is unclear if the mutation is disease causing or just a normal variation. It may take time and possibly additional testing to determine the meaning of a VUS result.   Test results take approximately 2-4 weeks on average. Discussed cost of testing through [email or web address]. Explained that the lab will contact patient if out of pocket costs are expected to exceed $100.     Discussed pros and cons of genetic testing. Explained that results could determine the cause for dilated cardiomyopathy. If a mutation is identified, presymptomatic testing is available to all at risk relatives. Reviewed possible issues associated with presymptomatic testing including genetic discrimination, current laws to prevent discrimination (ie. MASOUD), insurance issues, and emotional and psychosocial outcomes of testing.     Explained that clinical evaluation is recommended for all first degree relatives (parents, siblings, and children) of an affected individual regardless of decision to pursue genetic testing. Based on the Heart Failure Society of [place] Practice Guidelines (Madhu et al, 2009), clinical evaluation should be performed at least every 3-5 years beginning and childhood and should include history, cardiac exam, ECHO, and EKG.    We also briefly discussed history of NF1 in her grandchildren and some resources available to them, including the NF network (nfnetwork.org).      All questions answered at this time.   PLAN:  Ange elected to proceed with genetic testing.  Requisition and consent forms were completed and signed.  Blood was drawn and sample was sent to laboratory. I will contact patient when results are available.    TOTAL TIME SPENT IN COUNSELIN minutes    Velia Sharma MS  Licensed Genetic Counselor  [facility]  [facility]

## 2018-09-11 NOTE — IP AVS SNAPSHOT
[address] [address]                                       After Visit Summary   9/11/2018    Ange Christopher    MRN: [id number]           After Visit Summary Signature Page     I have received my discharge instructions, and my questions have been answered. I have discussed any challenges I see with this plan with the nurse or doctor.    ..........................................................................................................................................  Patient/Patient Representative Signature      ..........................................................................................................................................  Patient Representative Print Name and Relationship to Patient    ..................................................               ................................................  Date                                   Time    ..........................................................................................................................................  Reviewed by Signature/Title    ...................................................              ..............................................  Date                                               Time          22EPIC Rev 08/18

## 2018-09-11 NOTE — PROCEDURES
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    DESCRIPTION:  1. Consent obtained with discussion of risks.  All questions were answered.  2. Sterile prep and procedure.  3. Location with Sheaths:   Rt IJ  7 Fr 10 cm [short]  4. Access: Local anesthetic with lidocaine.  A standard 18 guage needle with ultrasound guidance was used to establish vascular access using a modified Seldinger technique.  5. Diagnostic Catheters:   7 Fr  [place]  6. Guiding Catheters:  None  6. Estimated blood loss: < 5 ml    MEDICATIONS:    Heart rate, BP, respiration, oxygen saturation and patient responses were monitored throughout the procedure with the assistance of the RN under my supervision.    Procedures:    HEMODYNAMICS:  1. HR 75 bpm  2. BP 99/54/69 mmHg  3. RA 6/6/5   4. RV 29/6  5. PA 27/12/17   6. PCW 14/14/12   7. PA sat 64%   9. Hgb 12.2 g/dL   10. Ana CO 4.2   11. Ana CI 2.6   12. TD CO 4.2   13. TD CI 2.6   14. PVR 1.2     Sheath Removal:  The RIJ sheath was manually removed in the cardiac catheterization laboratory.    Contrast: Isovue, 0 ml     Fluoroscopy Time: 0.4 min    COMPLICATIONS:  1. None    SUMMARY:   >> Normal right sided filling pressures.  >> Elevated left sided filling pressures.  >> Normal PA pressures  >> Normal cardiac output, 4.2 L/min with index 2.6 L/min/m2    PLAN:     >> Continued medical management and lifestyle modification for cardiovascular risk factor optimization.   >> Discharge today per protocol.  >> Follow up with Dr. Almanza as previously scheduled.    The attending interventional cardiologist was present and supervised all critical aspects the procedure.    Findings  discussed with patient and Dr. Almanza at end of case.    See CVIS report for final draft.    Severo Garcia MD  Cardiology Fellow    Severo Greene MD  Cardiology Staff    ATTENDING ATTESTATION: I was present and supervised the cardiology fellow throughout the procedure and reviewed the findings with the fellow at the completion of the procedure.  I agree with the documentation above.    Severo Greene MD, PhD  Interventional/Critical Care Cardiology  [phone]    September 11, 2018

## 2018-09-11 NOTE — PROGRESS NOTES
1415 Pt arrived on 2a post RHC. VSS RA. Dressing c/d/i. No pain. Dc instructions reviewed, copy given to pt. 1420 Pt eating and drinking. 1435 Pt dc'd home.

## 2018-09-11 NOTE — IP AVS SNAPSHOT
MRN:[id number]                      After Visit Summary   9/11/2018    Ange Christopher    MRN: [id number]           Visit Information        Department      9/11/2018 11:43 AM [address] [facility]          Review of your medicines      UNREVIEWED medicines. Ask your doctor about these medicines        Dose / Directions    AMBIEN 10 MG tablet   Used for:  Insomnia, unspecified type   Generic drug:  zolpidem        Dose:  10 mg   Take 1 tablet (10 mg) by mouth nightly as needed for sleep   Refills:  0       aspirin 81 MG tablet   Used for:  Preventative health care        Dose:  81 mg   Take 1 tablet (81 mg) by mouth daily   Refills:  0       cetirizine HCl 10 MG Caps   Used for:  Chronic seasonal allergic rhinitis, unspecified trigger        Dose:  10 mg   Take 10 mg by mouth daily as needed   Refills:  0       cholecalciferol 400 UNIT Tabs tablet   Commonly known as:  vitamin D3   Used for:  Osteopenia, unspecified location        Dose:  400 Units   Take 1 tablet (400 Units) by mouth 2 times daily   Refills:  0       clopidogrel 75 MG tablet   Commonly known as:  PLAVIX   Used for:  Left ventricular non-compaction cardiomyopathy (H)        Dose:  75 mg   Take 1 tablet (75 mg) by mouth daily   Refills:  0       ESTRADIOL 0.5/ESTRIOL 0.25 MG/GM CREAM   Used for:  Menopause        Use twice weekly   Refills:  0       fluorouracil 5 % cream   Commonly known as:  EFUDEX   Used for:  Other viral warts        Apply as directed to warts   Refills:  0       furosemide 80 MG tablet   Commonly known as:  LASIX   Used for:  Chronic systolic congestive heart failure (H)        Take one tab (80 mg) in am and 1/2 tab (40 mg) in pm daily   Refills:  0       gabapentin 300 MG capsule   Commonly known as:  NEURONTIN   Used for:  Neuropathy        Dose:  600 mg   Take 2 capsules (600 mg) by mouth 3 times daily   Refills:  0       LIPITOR 80 MG tablet   Used for:  Hyperlipidemia LDL goal <130   Generic drug:   atorvastatin        Dose:  80 mg   Take 1 tablet (80 mg) by mouth daily   Refills:  0       lisinopril 2.5 MG tablet   Commonly known as:  PRINIVIL/Zestril   Used for:  Left ventricular non-compaction cardiomyopathy (H)        Dose:  2.5 mg   Take 1 tablet (2.5 mg) by mouth daily   Quantity:  30 tablet   Refills:  11       metoprolol tartrate 100 MG tablet   Commonly known as:  LOPRESSOR   Used for:  Left ventricular non-compaction cardiomyopathy (H), Chronic systolic congestive heart failure (H)        Dose:  100 mg   Take 1 tablet (100 mg) by mouth 2 times daily   Refills:  0       Multi-vitamin Tabs tablet   Used for:  Preventative health care        Dose:  1 tablet   Take 1 tablet by mouth daily   Refills:  0       nexIUM 40 MG CR capsule   Used for:  Gastroesophageal reflux disease, esophagitis presence not specified   Generic drug:  esomeprazole        Dose:  40 mg   Take 1 capsule (40 mg) by mouth every morning (before breakfast) Take 30-60 minutes before eating.   Refills:  0       PROVIDER DOSED MANAGED WARFARIN (COUMADIN) OUTPATIENT        Unknown dose   Refills:  0       spironolactone 25 MG tablet   Commonly known as:  ALDACTONE   Used for:  Chronic systolic congestive heart failure (H)        Dose:  25 mg   Take 1 tablet (25 mg) by mouth daily   Refills:  0       ZOLOFT 100 MG tablet   Used for:  Depression, unspecified depression type   Generic drug:  sertraline        Dose:  100 mg   Take 1 tablet (100 mg) by mouth daily   Refills:  0                Protect others around you: Learn how to safely use, store and throw away your medicines at www.disposemymeds.org.         Follow-ups after your visit        Your next 10 appointments already scheduled     Sep 11, 2018  1:30 PM CDT   Heart Cath Right Heart Cath with [id number]   [facility] ([facility])    [address] [facility] [address]   [phone]            Sep 11, 2018  3:30 PM CDT    (Arrive by 3:15 PM)   Genetic Counseling with Velia Sharma GC   [facility] ([facility])    [address] [place] [address]  [address] [place] [id number] [phone]            Sep 11, 2018  5:00 PM CDT   (Arrive by 4:45 PM)   RETURN HEART FAILURE with Cady Almanza MD   [facility] ([facility])    [address] [place] [address]  [address] [place] [id number] [phone]               Care Instructions        Further instructions from your care team       [facility]                        Interventional Cardiology  Discharge Instructions   Post Right Heart Cath      AFTER YOU GO HOME:    DO drink plenty of fluids    DO resume your regular diet and medications unless otherwise instructed by your Primary Physician    Do Not scrub the procedure site vigorously    No lotion or powder to the puncture site for 3 days    CALL YOUR PRIMARY PHYSICIAN IF: You may resume all normal activity.  Monitor neck site for bleeding, swelling, or voice changes. If you notice bleeding or swelling immediately apply pressure to the site and call number below to speak with Cardiology Fellow.  If you experience any changes in your breathing you should call your doctor immediately or come to the closest Emergency Department.  Do not drive yourself.    ADDITIONAL INSTRUCTIONS: Medications: You are to resume all home medications including anticoagulation therapy unless otherwise advised by your primary cardiologist or nurse coordinator.    Follow Up: Per your primary cardiology team    If you have any questions or concerns regarding your procedure site please call [phone] at anytime and ask for Cardiology Fellow on call.  They are available 24 hours a day.  You may also contact the Cardiology Clinic after hours number at [phone].                                                       Telephone Numbers [phone]  Monday-Friday 8:00 am to 4:30 pm    [phone] After 4:30 pm Monday-Friday, Weekends & Holidays  Ask for Interventional Cardiologist on call. Someone is on call 24 hours/day   [facility] toll free number [phone] Monday-Friday 8:00 am to 4:30 pm   [facility] Emergency Dept [phone]                    Additional Information About Your Visit        [employer] Information     [employer] gives you secure access to your electronic health record. If you see a primary care provider, you can also send messages to your care team and make appointments. If you have questions, please call your primary care clinic.  If you do not have a primary care provider, please call [phone] and they will assist you.        Care EveryWhere ID     This is your Care EveryWhere ID. This could be used by other organizations to access your [facility] medical records  [id number]        Your Vitals Were     Blood Pressure Pulse Temperature Respirations Pulse Oximetry       102/45 (BP Location: Left arm) 81 97.1  F (36.2  C) (Oral) 20 99%        Primary Care Provider Office Phone # Fax #    Angel Corral [phone]      Equal Access to Services     KORY MIRANDA AH: Hadii ted ku hadasho Soomaali, waaxda luqadaha, qaybta kaalmada adeegyada, gasper nguyen hayterin haven lozano. So [facility] [phone].    ATENCIÓN: Si habla español, tiene a boswell disposición servicios gratuitos de asistencia lingüística. Llame al [phone].    We comply with applicable federal civil rights laws and [place] laws. We do not discriminate on the basis of race, color, national origin, age, disability, sex, sexual orientation, or gender identity.            Thank you!     Thank you for choosing [facility] for your care. Our goal is always to provide you with excellent care. Hearing back from our patients is one way we can continue to improve our services. Please take a few minutes to complete the written survey that you may receive in the mail  after you visit with us. Thank you!             Medication List: This is a list of all your medications and when to take them. Check marks below indicate your daily home schedule. Keep this list as a reference.      Medications           Morning Afternoon Evening Bedtime As Needed    AMBIEN 10 MG tablet   Take 1 tablet (10 mg) by mouth nightly as needed for sleep   Generic drug:  zolpidem                                aspirin 81 MG tablet   Take 1 tablet (81 mg) by mouth daily                                cetirizine HCl 10 MG Caps   Take 10 mg by mouth daily as needed                                cholecalciferol 400 UNIT Tabs tablet   Commonly known as:  vitamin D3   Take 1 tablet (400 Units) by mouth 2 times daily                                clopidogrel 75 MG tablet   Commonly known as:  PLAVIX   Take 1 tablet (75 mg) by mouth daily                                ESTRADIOL 0.5/ESTRIOL 0.25 MG/GM CREAM   Use twice weekly                                fluorouracil 5 % cream   Commonly known as:  EFUDEX   Apply as directed to warts                                furosemide 80 MG tablet   Commonly known as:  LASIX   Take one tab (80 mg) in am and 1/2 tab (40 mg) in pm daily                                gabapentin 300 MG capsule   Commonly known as:  NEURONTIN   Take 2 capsules (600 mg) by mouth 3 times daily                                LIPITOR 80 MG tablet   Take 1 tablet (80 mg) by mouth daily   Generic drug:  atorvastatin                                lisinopril 2.5 MG tablet   Commonly known as:  PRINIVIL/Zestril   Take 1 tablet (2.5 mg) by mouth daily                                metoprolol tartrate 100 MG tablet   Commonly known as:  LOPRESSOR   Take 1 tablet (100 mg) by mouth 2 times daily                                Multi-vitamin Tabs tablet   Take 1 tablet by mouth daily                                nexIUM 40 MG CR capsule   Take 1 capsule (40 mg) by mouth every morning (before breakfast)  Take 30-60 minutes before eating.   Generic drug:  esomeprazole                                PROVIDER DOSED MANAGED WARFARIN (COUMADIN) OUTPATIENT   Unknown dose                                spironolactone 25 MG tablet   Commonly known as:  ALDACTONE   Take 1 tablet (25 mg) by mouth daily                                ZOLOFT 100 MG tablet   Take 1 tablet (100 mg) by mouth daily   Generic drug:  sertraline

## 2018-09-11 NOTE — OR NURSING
Pt arrived at the Cardiac Catheterization Lab for a right heart cath.   7 fr sheath removed from the RIJ site. Manual pressure held until hemostasis has been obtained.  Soft, no hematoma.  No bleeding, oozing or discoloration seen.  Band Aid applied.  Pt educated to watch for any signs of bleeding, pain, or voice changes after discharge for the hospital.    Report called

## 2018-09-12 LAB — MISCELLANEOUS TEST: NORMAL

## 2018-09-12 NOTE — PROGRESS NOTES
Here is a copy of the progress note from your recent genetic counseling visit [facility] on Date: 2018.    PROGRESS NOTE: Ange was referred by  for genetic counseling due to her history of cardiomyopathy.  I had the opportunity to meet with Ange today to discuss the genetic component of cardiomyopathy and testing options available to her.    MEDICAL HISTORY: Ange reports that she was diagnosed with left ventricular non-compaction in  after experiencing flu like symptoms.  ECHO revealed reduced LVEF.  Heart monitoring showed PVC and ventricular tachyarrhythmia so an ICD was placed.  Recently her symptoms have worsened.      FAMILY HISTORY: A detailed family history was obtained at office visit on 2018.  (Please see scanned pedigree for details).  Family history was significant for the following:    Ange's son was diagnosed with nonischemic cardiomyopathy in .  He had an LVAD placed in .   Initially, his cardiomyopathy was thought to be the result of a virus but given her diagnosis it is likely to be familial. Ange has two daughters who are in good health.  One daughter has four sons and all three have been diagnosed with neurofibromatosis 1, inherited from their father who passed away a couple of years ago in his mid 30's.  Ange also had a son who  a few days after his premature birth.     nAge has a brother and sister in good health, although her sister has some back problems.  Another brother  from suicide.     Ange's mother  in her 70's but cause of death is unclear.  Ange's paternal aunt and uncle have no known health problems.  Her maternal grandfather  suddenly in his 60-70's.  Maternal grandmother  in her 70-80's but cause is unknown.     Ange's father is in good health at 83 years.  Little information is known about his siblings.  Her paternal grandmother  from reported blood clots but there was a questions  about heart disease as well. Paternal grandfather  in a hunting accident.      There is no additional history of cardiomyopathy, arrythmias, heart attacks, fainting, sudden cardiac death, genetic conditions, or birth defects.     DISCUSSION:  Left ventricular non compaction (LVNC) is a form of cardiomopathy in which the myocardium of the left ventricle does not compact during embrology.  LVNC can overlap with dilated cardiomyopathy (DCM) in the family.  Both conditions can be caused by both acquired and genetic causes.    The genes associated with LVNC overlap with the genes causing DCM.  Reviewed autosomal dominant (AD) inheritance pattern most commonly associated with LVNC/DCM, including the 50% risk for recurrence. Explained that DCM gene mutations are associated with reduced penetrance and variable expressivity, meaning that individuals who carry a gene mutation may or may not get the disease and onset and severity can vary from one family member to the next. This explains why you may not see cardiomyopathy in each generation of a family.   Currently over 40 genes have been found to be related to LVNC/DCM. Genetic testing currently identifies mutations in about 40% of idiopathic DCM cases. Detection rates in LVNC are not well defined at this time. Reviewed capabilities, limitations, and logistics of testing.    Explained three possible outcomes of genetic testing including: positive identification of a mutation, no mutation identified, and identification of a variant of unknown significance (VUS). If a mutation is identified, presymptomatic testing would be available to at risk family members. If no mutation is identified, it does not rule out the possibility of a genetic component to this disease. Family members could still be at risk for developing the same condition. If a VUS is identified, it is unclear if the mutation is disease causing or just a normal variation. It may take time and possibly  additional testing to determine the meaning of a VUS result.   Test results take approximately 2-4 weeks on average. Discussed cost of testing through [employer]. Explained that the lab will contact patient if out of pocket costs are expected to exceed $100.     Discussed pros and cons of genetic testing. Explained that results could determine the cause for dilated cardiomyopathy. If a mutation is identified, presymptomatic testing is available to all at risk relatives. Reviewed possible issues associated with presymptomatic testing including genetic discrimination, current laws to prevent discrimination (ie. MASOUD), insurance issues, and emotional and psychosocial outcomes of testing.     Explained that clinical evaluation is recommended for all first degree relatives (parents, siblings, and children) of an affected individual regardless of decision to pursue genetic testing. Based on the Heart Failure Society of [place] Practice Guidelines (Madhu et al, 2009), clinical evaluation should be performed at least every 3-5 years beginning and childhood and should include history, cardiac exam, ECHO, and EKG.    We also briefly discussed history of NF1 in her grandchildren and some resources available to them, including the NF network (nfnetwork.org).      All questions answered at this time.   PLAN:  Ange elected to proceed with genetic testing.  Requisition and consent forms were completed and signed.  Blood was drawn and sample was sent to laboratory. I will contact patient when results are available.    TOTAL TIME SPENT IN COUNSELIN minutes    Velia Sharma MS  Licensed Genetic Counselor  [facility]

## 2018-09-18 ENCOUNTER — CARE COORDINATION (OUTPATIENT)
Dept: CARDIOLOGY | Facility: CLINIC | Age: 58
End: 2018-09-18

## 2018-09-18 NOTE — PROGRESS NOTES
Contacted and spoke with patient today.  She reported that she had some increased palpitations for the first few days after decreasing her metoprolol, but feels it has settled down now and she feels good.  Requested she get labs (BMP) done next week per recommendation Dr. Almanza ans she has elected to have it drawn one day next week in [place] at the heart center. We will have an order in the computer in [place].

## 2018-10-08 ENCOUNTER — TELEPHONE (OUTPATIENT)
Dept: CARDIOLOGY | Facility: CLINIC | Age: 58
End: 2018-10-08

## 2018-10-08 NOTE — TELEPHONE ENCOUNTER
Spoke with Ange today to review results of genetic testing. She underwent genetic testing for the DCM Next panel. Blood was drawn on 9/11/18DATE and sent to [facility].   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  MYH7 (c.602 T>C) - The MYH7 gene functions as part of the sarcomere, which plays an important role in muscle contraction.  Mutations in the MYH7 gene are found in 5-8% of dilated cardiomyopathy (DCM) cases.  This particular variant has been reported multiple individuals with DCM and/or left ventricular noncompaction (LVNC). These results indicate that Eldons cardiomyopathy is caused by a variant in the MYH7 gene.   MYBPC3 (c.1363 C>T) - The MYBPC3 gene is found in less than 1% of DCM cases and some LVNC cases but over 40% of hypertrophic cardiomyopathy (HCM) cases. This particular variant replaces an amino acid with very similar properties in a position that is well conserved.  This variant has been found in [place] individuals with HCM. Computer models predicts this change will be damaging but there is not enough information to determine clinical significance of this alteration.  Therefore it is classified as a variant of uncertain significance (VUS).   MYH6 (c.4193 G>A) - The MYH6 gene also works to create muscle contraction.  It is associated with HCM and DCM. This variant creates a new amino acid with very similar properties in a position that is highly conserved.  It has been reported in individuals with cardiomyopathy but also in an unaffected individual with a family history of HCM.  Computer models are inconsistent, therefore, clinical significance remains uncertain-VUS.    Given the autosomal dominant inheritance of these variants, each of Ange's children and siblings has a 50% risk of inheriting each variant.  However, predictive screening is only recommended for the MYH7  variant.  Genetic testing is available to determine who is at risk for developing cardiomyopathy. However, age of onset and severity cannot be determined.   Her son who is diagnosed with LVNC could be screened for all variant to determine which are running with disease.    Reviewed recommendation for cardiac screening, including clinical exam, EKG, and ECHO for all first degree relatives. Clinical cardiac screening should be performed regardless of decision to pursue genetic testing.   A summary letter and copy of the results will be sent to patient. All questions answered at this time.   Velia Sharma MS  Licensed Genetic Counselor  [facility]

## 2018-10-11 ENCOUNTER — TRANSFERRED RECORDS (OUTPATIENT)
Dept: HEALTH INFORMATION MANAGEMENT | Facility: CLINIC | Age: 58
End: 2018-10-11

## 2018-10-11 NOTE — PROGRESS NOTES
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  The patient was initially seen by my colleague, Milagros Thacker MD, on January 22, 2014, and was seen later that same year, but was recently sent to re-establish care in my [facility] is [place] due to her young age and progressive symptoms. The patient reported that over the past several months she has had gradual worsening in her heart failure symptoms. She reports worsening of her exercise tolerance. She feels that she can still do approximately the same amount of activity, it just takes her much more energy to do it, and it is much more difficult for her to do. She does report worsening dyspnea on exertion and increasing issues with volume retention including weight gain, edema, and abdominal bloating. She admits having to take extra doses of Lasix more often. She denies any chest pain or chest pressure, orthopnea, or paroxysmal nocturnal dyspnea (PND). She also denies any palpitations. She denies any problems with her current medications and reports compliance.l stressors in her life at the present moment.       PAST MEDICAL HISTORY:  Nonischemic cardiomyopathy. The patient was initially diagnosed with her nonischemic cardiomyopathy in 2013. At that point in time she presented with flu-like symptoms and was ultimately discovered to have a low ejection fraction. Echocardiograms were consistent with non-compaction. The patient had a cardiac catheterization on January 18, 2013, and had no evidence of epicardial disease and her ejection fraction was 35%. She also had a Zio Patch study done around that time and was found to have some premature ventricular contractions (PVCs) as well as some ventricular  tachyarrhythmia. The patient underwent implantable cardioverter-defibrillator (ICD) placement in March 2013 and she was also started with medical therapy at that time. Due to patient's recent issues with worsening exercise tolerance, as well as atypical chest pain symptoms, the patient underwent a myocardial perfusion scan which revealed moderate-sized, mild-intensity, anterior and anteroseptal wall ischemia. Thus, it was deemed a positive myocardial perfusion scan for moderate-sized, mid-intensity wall ischemia. This was a Regadenoson stress test that was performed. The patient also had an echocardiogram done that same day which revealed left ventricular systolic function was severely reduced with a calculated left ventricular ejection fraction of 22%, the left ventricular cavity size was moderately enlarged with global hypokinesis. There was mild to moderate mitral regurgitation and mild tricuspid regurgitation, and compared to a previous echocardiogram done in September 2015 there was a higher degree of mitral regurgitation and tricuspid regurgitation. Following this positive myocardial Regadenoson stress single-photon emission computerized tomography (SPECT) scan the patient underwent cardiac catheterization. She was found to have minimal to nonocclusive coronary artery disease. A previous stent that was placed in January 2016 in the first diagonal branch of the left anterior descending (LAD) coronary artery which was found to be patent. Left ventricular (LV) systolic pressures were 99 to 103 mmHg with left ventricular end-diastolic pressures (LVEDP) of 21 to 24 mmHg.  Past Medical History:   Diagnosis Date     Arthritis 8/30/2018     C. difficile colitis; 3/2017, 4/2017 8/30/2018     Chronic anticoagulation 9/10/2018     Chronic systolic congestive heart failure (H) 8/27/2018     Current tobacco use; 1ppd since 1976 8/30/2018     Diverticulosis of large intestine 8/30/2018     Essential hypertension 8/30/2018  "    Hyperlipidemia LDL goal <130 2018     Left ventricular non-compaction cardiomyopathy (H) 2014     Osteopenia 2018     Other insomnia 9/10/2018     Palpitations 2018     PAST SURGICAL HISTORY    1.   .   2.   Hysterectomy with oophorectomy.   3.   Tonsillectomy.   4.   Left carpal tunnel surgery.  5.   Bunion surgery on her left foot.   6.   Implantable cardioverter-defibrillator (ICD) implantation in , and relocation in 2013.    FAMILY HISTORY:  Her mother  in her early 70s, she had no known heart problems. Her dad is currently alive with no known heart problems. There is no history of early coronary artery disease, sudden cardiac death, or congenital heart disease; however, her son had left ventricular assist device (LVAD) placed due to nonischemic cardiomyopathy in . Reportedly, he does not have non-compaction but this was thought to be a \"viral cardiomyopathy\". The patient also has a paternal grandmother who  from \"blood clots\", there was some concern she also may have had an issue with heart disease.    SOCIAL HISTORY:  She is an active smoker, she smokes approximately 1/2 to 1 pack per day. She has an approximately 70 pack-year history. She started smoking at age 16. She denies any ethanol (alcohol) use. She denies any intravenous (IV) drug abuse. She is , she has 3 kids.    CURRENT MEDICATIONS:    Current Outpatient Prescriptions on File Prior to Visit:  aspirin 81 MG tablet Take 1 tablet (81 mg) by mouth daily   atorvastatin (LIPITOR) 80 MG tablet Take 1 tablet (80 mg) by mouth daily   cetirizine HCl 10 MG CAPS Take 10 mg by mouth daily as needed   cholecalciferol (VITAMIN D3) 400 UNIT TABS tablet Take 1 tablet (400 Units) by mouth 2 times daily   clopidogrel (PLAVIX) 75 MG tablet Take 1 tablet (75 mg) by mouth daily   esomeprazole (NEXIUM) 40 MG CR capsule Take 1 capsule (40 mg) by mouth every morning (before breakfast) Take 30-60 minutes before " "eating.   ESTRADIOL 0.5/ESTRIOL 0.25 MG/GM CREAM Use twice weekly   fluorouracil (EFUDEX) 5 % cream Apply as directed to warts   furosemide (LASIX) 80 MG tablet Take one tab (80 mg) in am and 1/2 tab (40 mg) in pm daily   gabapentin (NEURONTIN) 300 MG capsule Take 2 capsules (600 mg) by mouth 3 times daily   multivitamin, therapeutic with minerals (MULTI-VITAMIN) TABS tablet Take 1 tablet by mouth daily   PROVIDER DOSED MANAGED WARFARIN, COUMADIN, OUTPATIENT Unknown dose   temazepam (RESTORIL) 15 MG capsule Take 15 mg by mouth nightly as needed for sleep   sertraline (ZOLOFT) 100 MG tablet Take 1 tablet (100 mg) by mouth daily     No current facility-administered medications on file prior to visit.       ROS:   Constitutional: No fever, chills, or sweats.   ENT: No visual disturbance, ear ache, epistaxis, sore throat.   Allergies/Immunologic: Negative.   Respiratory: No cough, hemoptysis.   Cardiovascular: As per HPI.   GI: No nausea, vomiting, hematemesis, melena, or hematochezia.   : No urinary frequency, dysuria, or hematuria.   Integument: Negative.   Psychiatric: Negative.   Neuro: Negative.   Endocrinology: Negative.   Musculoskeletal: Negative.    EXAM:  BP 97/62 (BP Location: Left arm, Patient Position: Chair, Cuff Size: Adult Regular)  Pulse 75  Ht 1.6 m (5' 3\")  Wt 57.2 kg (126 lb 3.2 oz)  SpO2 98%  BMI 22.36 kg/m2  General: appears comfortable, alert and articulate  Head: normocephalic, atraumatic  Eyes: anicteric sclera, EOMI  Neck: no adenopathy, 2+ carotids without bruits  Orophyarynx: moist mucosa, no lesions, dentition intact  Heart: regular, S1/S2, no murmur, gallop, rub, estimated JVP 6-7cm  Lungs: clear, no rales or wheezing  Abdomen: soft, non-tender, bowel sounds present, no hepatomegaly  Extremities: no clubbing, cyanosis, trace edema  Neurological: normal speech and affect, no gross motor deficits    Labs:  CBC RESULTS:  Lab Results   Component Value Date    WBC 10.3 09/11/2018    RBC " 4.03 09/11/2018    HGB 13.8 09/11/2018    HCT 40.9 09/11/2018     (H) 09/11/2018    MCH 34.2 (H) 09/11/2018    MCHC 33.7 09/11/2018    RDW 13.2 09/11/2018     09/11/2018       CMP RESULTS:  Lab Results   Component Value Date     09/11/2018    POTASSIUM 3.3 (L) 09/11/2018    CHLORIDE 105 09/11/2018    CO2 30 09/11/2018    ANIONGAP 5 09/11/2018    GLC 84 09/11/2018    BUN 13 09/11/2018    CR 0.63 09/11/2018    GFRESTIMATED >90 09/11/2018    GFRESTBLACK >90 09/11/2018    MAURO 8.4 (L) 09/11/2018    BILITOTAL 0.6 09/11/2018    ALBUMIN 3.5 09/11/2018    ALKPHOS 80 09/11/2018    ALT 42 09/11/2018    AST 28 09/11/2018        INR RESULTS:  Lab Results   Component Value Date    INR 1.19 (H) 09/11/2018       No results found for: MAG  No results found for: NTBNPI  Lab Results   Component Value Date    NTBNP 1281 (H) 09/11/2018     RHC 9/11/18  1. HR 75 bpm  2. BP 99/54/69 mmHg  3. RA 6/6/5   4. RV 29/6  5. PA 27/12/17   6. PCW 14/14/12   7. PA sat 64%   9. Hgb 12.2 g/dL   10. Ana CO 4.2   11. Ana CI 2.6   12. TD CO 4.2   13. TD CI 2.6   14. 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   1. Chronic systolic heart failure secondary to nonischemic/familial cardiomyopathy.    Stage C  NYHA Class IIIb  ACEi/ARB will start losartan 25mg daily  BB decrease metoprolol xl to 100mg daily  Aldosterone antagonist discontinue aldactone while trying to initiate and up-titrate losartan  SCD prophylaxis ICD  Fluid status euvolemic confirmed by RHC today  CPX revealed exercise limitations patient did not meet anaerobic threshold thus limitations not all cardiac in nature.  At present no indications for advanced therapy given results of RHC.  I did relate  to the patient that for her to be a candidate for heart transplantation if needed in the future, that she would need to be cigarette and completely tobacco free, including tobacco replacement products, for at least 3, but preferably 6, months. The patient would also need to have further evaluation of other comorbidities to determine candidacy for advanced therapy should this be indicated in future  Encouraged patient to begin regular aerobic exercise aiming for at least 150 minutes of moderate physical activity each week. and follow low-salt, heart healthy diet.    Pt met with genetic counselor today and opted to pursue genetic testing for familial dilated cardiomyopathy.  Will f/u results.       Follow up:  Labs in 2 weeks.  To see me in October in [place] as scheduled      Beto Almanza MD  Section Head - Advanced Heart Failure, Transplantation and Mechanical Circulatory Support  Co-Director - [facility]  Associate Professor of Medicine, [facility]  Patient Care Team:  Angel Corral as PCP - General  BETO ALMANZA

## 2018-10-14 LAB — LAB SCANNED RESULT: ABNORMAL

## 2019-02-21 ENCOUNTER — MEDICAL CORRESPONDENCE (OUTPATIENT)
Dept: CARDIOLOGY | Facility: CLINIC | Age: 59
End: 2019-02-21
Payer: MEDICARE

## 2019-02-21 ENCOUNTER — TRANSFERRED RECORDS (OUTPATIENT)
Dept: HEALTH INFORMATION MANAGEMENT | Facility: CLINIC | Age: 59
End: 2019-02-21

## 2019-02-21 PROCEDURE — 99214 OFFICE O/P EST MOD 30 MIN: CPT | Mod: ZP | Performed by: INTERNAL MEDICINE

## 2019-10-21 ENCOUNTER — TRANSFERRED RECORDS (OUTPATIENT)
Dept: HEALTH INFORMATION MANAGEMENT | Facility: CLINIC | Age: 59
End: 2019-10-21

## 2019-10-21 ENCOUNTER — MEDICAL CORRESPONDENCE (OUTPATIENT)
Dept: CARDIOLOGY | Facility: CLINIC | Age: 59
End: 2019-10-21
Payer: MEDICARE

## 2019-10-21 PROCEDURE — 99214 OFFICE O/P EST MOD 30 MIN: CPT | Mod: ZP | Performed by: INTERNAL MEDICINE

## 2019-10-23 ENCOUNTER — TELEPHONE (OUTPATIENT)
Dept: CARDIOLOGY | Facility: CLINIC | Age: 59
End: 2019-10-23

## 2019-10-23 NOTE — TELEPHONE ENCOUNTER
[facility] Call Center    Phone Message    May a detailed message be left on voicemail: yes    Reason for Call: Medication Question or concern regarding medication   Prescription Clarification  Name of Medication: Entresto  Prescribing Provider: Dr. Almanza   Pharmacy: unknown   What on the order needs clarification? Pt requesting call back from Leslie to discuss this medication          Action Taken: Message routed to:  [facility] cardio

## 2019-10-24 NOTE — TELEPHONE ENCOUNTER
Called and spoke with patient.  See separate care coordination note from today for details of the conversation.

## 2019-11-08 ENCOUNTER — TELEPHONE (OUTPATIENT)
Dept: CARDIOLOGY | Facility: CLINIC | Age: 59
End: 2019-11-08

## 2019-11-08 ENCOUNTER — CARE COORDINATION (OUTPATIENT)
Dept: CARDIOLOGY | Facility: CLINIC | Age: 59
End: 2019-11-08

## 2019-11-08 NOTE — PROGRESS NOTES
Patient called last week to report that after several days after she increased her entresto to 97/103 she began having increased diarrhea. She has a h/o lymphocytic colitis and when she was started on the entresto several months ago she had diarrhea but felt she was having a colitis flare and did not associate it with the entresto at that time.  Recommended she decrease the dose to previous dose of 49/51 and see if the diarrhea improved.    Patient called back today to report diarrhea had not improved on the lower dose and would like to stop the entresto and go back on her previous cardiac meds (losartan and metoprolol).  Instructed her to decrease to lowest dose 24/26 mg bid and I will notify  for her recommendations.

## 2019-11-08 NOTE — TELEPHONE ENCOUNTER
[facility] Call Center    Phone Message    May a detailed message be left on voicemail: yes    Reason for Call: Other: Ange calling to discuss switching her medication. Ange says she is currently on  Entresto and it is giving her diarrhea. Ange would like to discuss with someone about switching back to her old medication. Please give Ange a call back at your earliest convenience to discuss.     Action Taken: Message routed to:  [facility] ([facility]): MARIA DE JESUS Hutchins

## 2019-11-13 ENCOUNTER — CARE COORDINATION (OUTPATIENT)
Dept: CARDIOLOGY | Facility: CLINIC | Age: 59
End: 2019-11-13

## 2019-11-13 DIAGNOSIS — I50.22 CHRONIC SYSTOLIC CONGESTIVE HEART FAILURE (H): ICD-10-CM

## 2019-11-14 RX ORDER — LOSARTAN POTASSIUM 25 MG/1
TABLET ORAL
Qty: 180 TABLET | Refills: 3 | Status: SHIPPED | OUTPATIENT
Start: 2019-11-14

## 2019-11-14 NOTE — PROGRESS NOTES
"Patient called to report her diarrhea on the low dose of entresto was still present after 3 days so she stopped it.  She had previously been on losartan 25 mg daily and has not been back on it for 24-48 hours.  I sent message to her local PA, Dalia Arana and also to Dr. Almanza.  See recommendations and plan below.    \"On 10/21 her spironolactone was stopped by Dr. Almanza when the entresto was increased, and then pt saw Dr. Patel 10/24, not really sure why. I would have her restart losartan 25mg daily for 1 week then increase to 50mg daily, I can then see her back with labs in 2-3 weeks to re-evaluate re-increase losartan further and in future add spironolactone again.  She does not have a f/u appt yet made with anyone.\"    "

## 2020-03-02 ENCOUNTER — HEALTH MAINTENANCE LETTER (OUTPATIENT)
Age: 60
End: 2020-03-02

## 2020-12-20 ENCOUNTER — HEALTH MAINTENANCE LETTER (OUTPATIENT)
Age: 60
End: 2020-12-20

## 2021-04-24 ENCOUNTER — HEALTH MAINTENANCE LETTER (OUTPATIENT)
Age: 61
End: 2021-04-24

## 2021-09-07 ENCOUNTER — MEDICAL CORRESPONDENCE (OUTPATIENT)
Dept: CARDIOLOGY | Facility: CLINIC | Age: 61
End: 2021-09-07
Payer: MEDICARE

## 2021-09-07 ENCOUNTER — TRANSFERRED RECORDS (OUTPATIENT)
Dept: HEALTH INFORMATION MANAGEMENT | Facility: CLINIC | Age: 61
End: 2021-09-07

## 2021-10-03 ENCOUNTER — HEALTH MAINTENANCE LETTER (OUTPATIENT)
Age: 61
End: 2021-10-03

## 2022-03-20 ENCOUNTER — HEALTH MAINTENANCE LETTER (OUTPATIENT)
Age: 62
End: 2022-03-20

## 2022-03-21 LAB — EJECTION FRACTION: 30 %

## 2022-03-24 ENCOUNTER — MEDICAL CORRESPONDENCE (OUTPATIENT)
Dept: CARDIOLOGY | Facility: CLINIC | Age: 62
End: 2022-03-24
Payer: MEDICARE

## 2022-05-15 ENCOUNTER — HEALTH MAINTENANCE LETTER (OUTPATIENT)
Age: 62
End: 2022-05-15

## 2022-09-10 ENCOUNTER — HEALTH MAINTENANCE LETTER (OUTPATIENT)
Age: 62
End: 2022-09-10

## 2023-03-28 ENCOUNTER — MEDICAL CORRESPONDENCE (OUTPATIENT)
Dept: CARDIOLOGY | Facility: CLINIC | Age: 63
End: 2023-03-28

## 2023-03-28 ENCOUNTER — TRANSFERRED RECORDS (OUTPATIENT)
Dept: HEALTH INFORMATION MANAGEMENT | Facility: CLINIC | Age: 63
End: 2023-03-28

## 2023-06-03 ENCOUNTER — HEALTH MAINTENANCE LETTER (OUTPATIENT)
Age: 63
End: 2023-06-03

## 2024-03-21 ENCOUNTER — MEDICAL CORRESPONDENCE (OUTPATIENT)
Dept: CARDIOLOGY | Facility: CLINIC | Age: 64
End: 2024-03-21
Payer: MEDICARE

## 2024-03-21 ENCOUNTER — TRANSFERRED RECORDS (OUTPATIENT)
Dept: HEALTH INFORMATION MANAGEMENT | Facility: CLINIC | Age: 64
End: 2024-03-21
Payer: MEDICARE

## 2024-03-21 ENCOUNTER — MEDICAL CORRESPONDENCE (OUTPATIENT)
Dept: CARDIOLOGY | Facility: CLINIC | Age: 64
End: 2024-03-21

## 2024-03-25 DIAGNOSIS — I50.23 ACUTE ON CHRONIC SYSTOLIC HEART FAILURE (H): Primary | ICD-10-CM

## 2024-03-26 ENCOUNTER — REFERRAL (OUTPATIENT)
Dept: TRANSPLANT | Facility: CLINIC | Age: 64
End: 2024-03-26

## 2024-04-16 ENCOUNTER — CARE COORDINATION (OUTPATIENT)
Dept: CARDIOLOGY | Facility: CLINIC | Age: 64
End: 2024-04-16
Payer: MEDICARE

## 2024-04-16 NOTE — PROGRESS NOTES
Ange DAMICO Christopher was referred for VAD Eval.  Per Dr. Ellison's instructions, eval has been put on pause due to unable to get financial approval for VAD Eval here.  Referral will be sent to a the [facility].  Dr. Ellison  and BERNARDINO Allen have been notified.  VAD Episode has been closed.

## 2024-04-18 ENCOUNTER — MEDICAL CORRESPONDENCE (OUTPATIENT)
Dept: CARDIOLOGY | Facility: CLINIC | Age: 64
End: 2024-04-18

## 2024-04-28 ENCOUNTER — HEALTH MAINTENANCE LETTER (OUTPATIENT)
Age: 64
End: 2024-04-28

## (undated) RX ORDER — LIDOCAINE 40 MG/G
CREAM TOPICAL
Status: DISPENSED
Start: 2018-09-11

## (undated) RX ORDER — LIDOCAINE HYDROCHLORIDE 10 MG/ML
INJECTION, SOLUTION EPIDURAL; INFILTRATION; INTRACAUDAL; PERINEURAL
Status: DISPENSED
Start: 2018-09-11